# Patient Record
Sex: FEMALE | Race: WHITE | Employment: FULL TIME | URBAN - METROPOLITAN AREA
[De-identification: names, ages, dates, MRNs, and addresses within clinical notes are randomized per-mention and may not be internally consistent; named-entity substitution may affect disease eponyms.]

---

## 2017-01-31 ENCOUNTER — OFFICE VISIT (OUTPATIENT)
Dept: BEHAVIORAL/MENTAL HEALTH CLINIC | Age: 47
End: 2017-01-31

## 2017-01-31 VITALS
SYSTOLIC BLOOD PRESSURE: 115 MMHG | BODY MASS INDEX: 25.58 KG/M2 | HEART RATE: 84 BPM | DIASTOLIC BLOOD PRESSURE: 69 MMHG | WEIGHT: 148 LBS

## 2017-01-31 DIAGNOSIS — F33.42 MAJOR DEPRESSION, RECURRENT, FULL REMISSION (HCC): Primary | ICD-10-CM

## 2017-01-31 DIAGNOSIS — F41.1 GENERALIZED ANXIETY DISORDER: ICD-10-CM

## 2017-01-31 DIAGNOSIS — F32.5 MAJOR DEPRESSION IN COMPLETE REMISSION (HCC): ICD-10-CM

## 2017-01-31 RX ORDER — BUPROPION HYDROCHLORIDE 300 MG/1
300 TABLET ORAL
Qty: 90 TAB | Refills: 1 | Status: SHIPPED | OUTPATIENT
Start: 2017-01-31 | End: 2017-07-31 | Stop reason: SDUPTHER

## 2017-01-31 RX ORDER — ESCITALOPRAM OXALATE 20 MG/1
20 TABLET ORAL DAILY
Qty: 90 TAB | Refills: 1 | Status: SHIPPED | OUTPATIENT
Start: 2017-01-31 | End: 2017-07-31 | Stop reason: SDUPTHER

## 2017-01-31 RX ORDER — ZOLPIDEM TARTRATE 10 MG/1
10 TABLET ORAL
Qty: 30 TAB | Refills: 0 | Status: SHIPPED | OUTPATIENT
Start: 2017-01-31 | End: 2017-07-31 | Stop reason: SDUPTHER

## 2017-01-31 NOTE — MR AVS SNAPSHOT
Visit Information Date & Time Provider Department Dept. Phone Encounter #  
 1/31/2017  2:30 PM Julee Beal MD [address] [facility] [phone] [id number] Upcoming Health Maintenance Date Due  
 PAP AKA CERVICAL CYTOLOGY 5/13/2017 DTaP/Tdap/Td series (2 - Td) 7/10/2025 Allergies as of 1/31/2017  Review Complete On: 1/31/2017 By: Rodger Oliveira Severity Noted Reaction Type Reactions Pcn [Penicillins]  02/28/2011    Rash Current Immunizations  Reviewed on 7/10/2015 Name Date Influenza Vaccine 10/16/2015, 12/2/2014, 10/1/2013 Influenza Vaccine Whole 10/1/2010 TD Vaccine 2/28/2008 Tdap 7/10/2015 Not reviewed this visit You Were Diagnosed With   
  
 Codes Comments [facility]     ICD-10-CM: F32.5 ICD-9-CM: 296.26 Generalized anxiety disorder     ICD-10-CM: F41.1 ICD-9-CM: 300.02 Vitals BP Pulse Weight(growth percentile) LMP BMI OB Status 115/69 (BP 1 Location: Right arm) 84 148 lb (67.1 kg) 01/26/2017 25.58 kg/m2 Having regular periods Smoking Status Never Smoker BMI and BSA Data Body Mass Index Body Surface Area 25.58 kg/m 2 1.74 m 2 Preferred Pharmacy Pharmacy Name Phone  [address] [phone] Your Updated Medication List  
  
   
This list is accurate as of: 1/31/17  3:00 PM.  Always use your most recent med list.  
  
  
  
  
 Biotin 2,500 mcg Cap Take  by mouth. buPROPion  mg XL tablet Commonly known as:  Smiley Sizer Take 1 Tab by mouth every morning. cholecalciferol 1,000 unit tablet Commonly known as:  VITAMIN D3 Take  by mouth daily. cyanocobalamin 1,000 mcg sublingual tablet Commonly known as:  VITAMIN B-12 Take 1,000 mcg by mouth daily. escitalopram oxalate 20 mg tablet Commonly known as:  Duwaine Needs Take 1 Tab by mouth daily. levonorgestrel-ethinyl estradiol 50-30 (6)/75-40 (5)/125-30(10) Tab Commonly known as:  [employer] Take 1 Tab by mouth daily. multivitamin tablet Commonly known as:  ONE A DAY Take 1 Tab by mouth daily. SYNTHROID 137 mcg tablet Generic drug:  levothyroxine Take  by mouth Daily (before breakfast). zolpidem 10 mg tablet Commonly known as:  AMBIEN Take 1 Tab by mouth nightly as needed for Sleep. Prescriptions Printed Refills  
 zolpidem (AMBIEN) 10 mg tablet 0 Sig: Take 1 Tab by mouth nightly as needed for Sleep. Class: Print Route: Oral  
  
Prescriptions Sent to Pharmacy Refills buPROPion XL (WELLBUTRIN XL) 300 mg XL tablet 1 Sig: Take 1 Tab by mouth every morning. Class: Normal  
 Pharmacy: [place] [address] Ph #: [phone] Route: Oral  
 escitalopram oxalate (LEXAPRO) 20 mg tablet 1 Sig: Take 1 Tab by mouth daily. Class: Normal  
 Pharmacy: [place] [address] Ph #: [phone] Route: Oral  
  
Introducing [facility] & HEALTH SERVICES! Dear Lu Cleveland: Thank you for requesting a Deezer account. Our records indicate that you already have an active Deezer account. You can access your account anytime at [email or web address] Did you know that you can access your hospital and ER discharge instructions at any time in Deezer? You can also review all of your test results from your hospital stay or ER visit. Additional Information If you have questions, please visit the Frequently Asked Questions section of the Deezer website at [email or web address] Remember, Deezer is NOT to be used for urgent needs. For medical emergencies, dial 911. Now available from your iPhone and Android! Please provide this summary of care documentation to your next provider. Your primary care clinician is listed as [address]. If you have any questions after today's visit, please call [phone].

## 2017-01-31 NOTE — PROGRESS NOTES
Psychiatric Outpatient Progress Note    Date: 1/31/2017  Account Number:  [id number]  Name: Annie Whittaker    Length of psychotherapy session: 20 minutes       SUBJECTIVE:   Annie Whittaker  is a 52 y.o.  female  patient presents for a therapy/psychopharmacological management appointment. Ms. Swathi Woodward presents to the appointment reporting stable mood despite losing her job in August 2017. She had to increase her Lexapro to 20mg in September due to decline in her mood and she says that it was very helpful. She is currently looking for a job, but she has severance until December 2017. Continued relationship with boyfriend of 3 years. Continued Exercise with a . Primary stressor- looking for work, mother's depression          PFSHx: no changes  ROS:   Appetite:good , Sleep: much improved; A review of neurological, orthopedic, HEENT, Constitutional systems were within normal limits.     Weight gain- 160.6lbs    Response to Treatment: improving  Side Effects: denied    Supportive/Cognitive/Reality-Oriented psychotherapy provided in regards to psychosocial stressors:   Current problems   Housing issues   Occupational issues- unemployed; looking for work   Academic issues   Legal issues   Medical issues   Interpersonal conflicts  Psychoeducation provided  Treatment plan reviewed with patient-including diagnosis and medications  Worked on issues of denial & effects of substance dependency/use      OBJECTIVE:                 Mental Status exam: WNL except for      Sensorium  oriented to time, place and person   Relations cooperative    Eye Contact    appropriate   Appearance:  age appropriate and casually dressed   Motor Behavior:  gait stable   Speech:  normal pitch and normal volume   Thought Process: goal directed and within normal limits   Thought Content free of delusions and free of hallucinations   Suicidal ideations none   Homicidal ideations none   Mood:   stable, improved   Affect:   stable, improved Memory recent  adequate   Memory remote:  adequate   Concentration:  adequate   Abstraction:  abstract   Insight:  good   Reliability good   Judgment:  good       Allergies   Allergen Reactions    Pcn [Penicillins] Rash        Current Outpatient Prescriptions   Medication Sig Dispense Refill    buPROPion XL (WELLBUTRIN XL) 300 mg XL tablet Take 1 Tab by mouth every morning. 90 Tab 1    escitalopram oxalate (LEXAPRO) 20 mg tablet Take 1 Tab by mouth daily. 90 Tab 1    zolpidem (AMBIEN) 10 mg tablet Take 1 Tab by mouth nightly as needed for Sleep. 30 Tab 0    levonorgestrel-ethinyl estradiol (MYZILRA) 50-30 (6)/75-40 (5)/125-30(10) tab Take 1 Tab by mouth daily. 84 Tab 2    cholecalciferol (VITAMIN D3) 1,000 unit tablet Take  by mouth daily.  cyanocobalamin (VITAMIN B-12) 1,000 mcg sublingual tablet Take 1,000 mcg by mouth daily.  Biotin 2,500 mcg cap Take  by mouth.  multivitamin (ONE A DAY) tablet Take 1 Tab by mouth daily.  levothyroxine (SYNTHROID) 137 mcg tablet Take  by mouth Daily (before breakfast). MEDICAL DECISION MAKING    Data: pertinent labs, imaging, medical records and diagnostic tests reviewed and incorporated in diagnosis and treatment plan    Diagnoses/ Problems:   1. Depression    Improving, stable  2. Generalized Anxiety Disorder    Improving, stable    Assessment:  Rosalva Goodman  is a 52 y.o.  female patient presents with improving depression and anxiety. Risk Scoring- chronic illnesses and prescription drug management    Treatment Plan:  1.   Medications:     Continue Wellbutrin, Lexapro 20mg daily        Orders Placed This Encounter    buPROPion XL (WELLBUTRIN XL) 300 mg XL tablet    escitalopram oxalate (LEXAPRO) 20 mg tablet    zolpidem (AMBIEN) 10 mg tablet           The following regarding medications was addressed:    (The risks and benefits of the proposed medication; the potential medication side effects ie    dry mouth, weight gain, GI upset, headache; patient given opportunity to ask questions)       Medication Changes/Adjustments:   Medications Discontinued During This Encounter   Medication Reason    buPROPion XL (WELLBUTRIN XL) 300 mg XL tablet Reorder    escitalopram oxalate (LEXAPRO) 10 mg tablet Reorder    zolpidem (AMBIEN) 10 mg tablet Reorder      2. Provided supportive psychotherapy: addressed safety risk, recent stressors, provided validation and assisted with coping skills and problem solving  3. Labs/ tests/ old records/ collateral will be obtained  4. Follow-up Disposition:  Return in about 6 months (around 7/31/2017).

## 2017-03-30 ENCOUNTER — OFFICE VISIT (OUTPATIENT)
Dept: INTERNAL MEDICINE CLINIC | Age: 47
End: 2017-03-30

## 2017-03-30 VITALS
SYSTOLIC BLOOD PRESSURE: 130 MMHG | BODY MASS INDEX: 24.92 KG/M2 | HEIGHT: 64 IN | HEART RATE: 81 BPM | DIASTOLIC BLOOD PRESSURE: 90 MMHG | RESPIRATION RATE: 18 BRPM | TEMPERATURE: 99.7 F | WEIGHT: 146 LBS | OXYGEN SATURATION: 97 %

## 2017-03-30 DIAGNOSIS — R68.83 CHILLS: ICD-10-CM

## 2017-03-30 DIAGNOSIS — J06.9 VIRAL URI WITH COUGH: Primary | ICD-10-CM

## 2017-03-30 DIAGNOSIS — R05.9 COUGH: ICD-10-CM

## 2017-03-30 LAB
QUICKVUE INFLUENZA TEST: NEGATIVE
VALID INTERNAL CONTROL?: YES

## 2017-03-30 NOTE — PROGRESS NOTES
Chief Complaint   Patient presents with    Cough     x 1 day    Nasal Congestion     mucous clear and thick    Dizziness     x 1 day    Chills    Nausea     1. Have you been to the ER, urgent care clinic since your last visit? Hospitalized since your last visit? No    2. Have you seen or consulted any other health care providers outside of the [address] [place] [address] since your last visit? Include any pap smears or colon screening.  Yes When: last Fall  Where: [facility]

## 2017-03-30 NOTE — PROGRESS NOTES
53 yo female with a URI that presented in chest first.  She felt feverish yesterday, and, despite having had a flu shot, she was fearful she may have flu. She attended a large event 5 days ago, but has not traveled. She has been taking Tylenol. PE: WNWD WF   T - 99.7   Phar - nl   Nodes - none palp   TMs - normal   Lungs - clear    Imp: URI w/ cough    Plan: Mucinex, Saline NS, Hydration  ____________________  Expected course of current diagnosed problem(s) as well as expected progression and possible complications, and desired follow up with provider are discussed with patient. Patient is encouraged to be back in touch with any questions or concerns. Patient expresses understanding of plan of care. Patient is given AVS which includes diagnoses, current medications, vitals.

## 2017-03-30 NOTE — MR AVS SNAPSHOT
Visit Information Date & Time Provider Department Dept. Phone Encounter #  
 3/30/2017  3:00 PM Jayden Hobbs NP [place] [id number] [facility] [id number] Your Appointments 7/31/2017  3:30 PM  
ESTABLISHED PATIENT with Eugenio Henderson MD  
[address] [address]) Appt Note: fu  
 [facility] [address] [place] [address]  
[phone] [address] [place] [address] Upcoming Health Maintenance Date Due  
 PAP AKA CERVICAL CYTOLOGY 5/13/2017 DTaP/Tdap/Td series (2 - Td) 7/10/2025 Allergies as of 3/30/2017  Review Complete On: 3/30/2017 By: Jayden Hobbs NP Severity Noted Reaction Type Reactions Pcn [Penicillins]  02/28/2011    Rash Current Immunizations  Reviewed on 7/10/2015 Name Date Influenza Vaccine 10/16/2015, 12/2/2014, 10/1/2013 Influenza Vaccine Whole 10/1/2010 TD Vaccine 2/28/2008 Tdap 7/10/2015 Not reviewed this visit You Were Diagnosed With   
  
 Codes Comments Viral URI with cough    -  Primary ICD-10-CM: J06.9, B97.89 ICD-9-CM: 465.9 Chills     ICD-10-CM: R68.83 ICD-9-CM: 780.64 Cough     ICD-10-CM: R05 ICD-9-CM: 579. 2 Vitals BP Pulse Temp Resp Height(growth percentile) Weight(growth percentile) 130/90 (BP 1 Location: Left arm, BP Patient Position: Sitting) 81 99.7 °F (37.6 °C) (Oral) 18 5' 3.78\" (1.62 m) 146 lb (66.2 kg) LMP SpO2 BMI OB Status Smoking Status 03/22/2017 97% 25.23 kg/m2 Having regular periods Never Smoker Vitals History BMI and BSA Data Body Mass Index Body Surface Area  
 25.23 kg/m 2 1.73 m 2 Preferred Pharmacy Pharmacy Name Phone  [address] [phone] Your Updated Medication List  
  
   
This list is accurate as of: 3/30/17  4:05 PM.  Always use your most recent med list.  
  
  
  
  
 Biotin 2,500 mcg Cap Take  by mouth. buPROPion  mg XL tablet Commonly known as:  Zoila Aragon Take 1 Tab by mouth every morning. cholecalciferol 1,000 unit tablet Commonly known as:  VITAMIN D3 Take  by mouth daily. cyanocobalamin 1,000 mcg sublingual tablet Commonly known as:  VITAMIN B-12 Take 1,000 mcg by mouth daily. escitalopram oxalate 20 mg tablet Commonly known as:  [employer] B[employer] Take 1 Tab by mouth daily. levonorgestrel-ethinyl estradiol 50-30 (6)/75-40 (5)/125-30(10) Tab Commonly known as:  [employer] Take 1 Tab by mouth daily. multivitamin tablet Commonly known as:  ONE A DAY Take 1 Tab by mouth daily. SYNTHROID 137 mcg tablet Generic drug:  levothyroxine Take  by mouth Daily (before breakfast). zolpidem 10 mg tablet Commonly known as:  AMBIEN Take 1 Tab by mouth nightly as needed for Sleep. We Performed the Following AMB POC RAPID INFLUENZA TEST [40417 CPT(R)] Patient Instructions 1. Mucinex (Guaifenesen) plain-Blue Box 600 mg. Take one twice daily with full glass water Take for 10 days 2. Saline Nasal Spray - use liberally to flush post-nasal area; use as many times a day as desired. Keep spraying with head tilted back until you feel need to swallow 3. Drink lots of fluids (mainly water) to keep mucus thinner 4. If needed, for cough, we recommend Delsym cough syrup 5. Long acting antihistamine (Allegra/Fexofenadine or Zyrtec/Ceterizine) is useful if allergy symptoms are also present 6. Decongestants should not be used as they actually contribute to overdrying/thickening of the mucus, and can raise the BP and overstimulate the heart 7. Steroid nasal spray (Nasacort AQ) - 2 sprays each nostril once daily; use with head in upright position Introducing \[facility]\"" & HEALTH SERVICES! Dear Judy Lock: Thank you for requesting a RedMarthart account.   Our records indicate that you already have an active EatingWell account. You can access your account anytime at [email or web address] Did you know that you can access your hospital and ER discharge instructions at any time in EatingWell? You can also review all of your test results from your hospital stay or ER visit. Additional Information If you have questions, please visit the Frequently Asked Questions section of the EatingWell website at [email or web address] Remember, EatingWell is NOT to be used for urgent needs. For medical emergencies, dial 911. Now available from your iPhone and Android! Please provide this summary of care documentation to your next provider. Your primary care clinician is listed as Anastasia King. If you have any questions after today's visit, please call [phone].

## 2017-03-30 NOTE — PATIENT INSTRUCTIONS
1. Mucinex (Guaifenesen) plain-Blue Box 600 mg. Take one twice daily with full glass water   Take for 10 days    2. Saline Nasal Spray - use liberally to flush post-nasal area; use as many times a day as desired. Keep spraying with head tilted back until you feel need to swallow    3. Drink lots of fluids (mainly water) to keep mucus thinner    4. If needed, for cough, we recommend Delsym cough syrup    5. Long acting antihistamine (Allegra/Fexofenadine or Zyrtec/Ceterizine) is useful if allergy symptoms are also present    6. Decongestants should not be used as they actually contribute to overdrying/thickening of the mucus, and can raise the BP and overstimulate the heart    7.  Steroid nasal spray (Nasacort AQ) - 2 sprays each nostril once daily; use with head in upright position

## 2017-07-17 RX ORDER — LEVONORGESTREL AND ETHINYL ESTRADIOL 6-5-10
KIT ORAL
Qty: 84 TAB | Refills: 0 | Status: SHIPPED | OUTPATIENT
Start: 2017-07-17 | End: 2017-08-01 | Stop reason: SDUPTHER

## 2017-07-31 ENCOUNTER — OFFICE VISIT (OUTPATIENT)
Dept: BEHAVIORAL/MENTAL HEALTH CLINIC | Age: 47
End: 2017-07-31

## 2017-07-31 DIAGNOSIS — F33.42 MAJOR DEPRESSION, RECURRENT, FULL REMISSION (HCC): Primary | ICD-10-CM

## 2017-07-31 DIAGNOSIS — F41.1 GENERALIZED ANXIETY DISORDER: ICD-10-CM

## 2017-07-31 DIAGNOSIS — F32.5 MAJOR DEPRESSION IN COMPLETE REMISSION (HCC): ICD-10-CM

## 2017-07-31 RX ORDER — ZOLPIDEM TARTRATE 10 MG/1
10 TABLET ORAL
Qty: 90 TAB | Refills: 1 | Status: SHIPPED | OUTPATIENT
Start: 2017-07-31 | End: 2017-11-13

## 2017-07-31 RX ORDER — BUPROPION HYDROCHLORIDE 300 MG/1
300 TABLET ORAL
Qty: 90 TAB | Refills: 1 | Status: SHIPPED | OUTPATIENT
Start: 2017-07-31 | End: 2017-11-13 | Stop reason: SDUPTHER

## 2017-07-31 RX ORDER — ESCITALOPRAM OXALATE 20 MG/1
20 TABLET ORAL DAILY
Qty: 90 TAB | Refills: 1 | Status: SHIPPED | OUTPATIENT
Start: 2017-07-31 | End: 2017-11-13 | Stop reason: SDUPTHER

## 2017-07-31 NOTE — PROGRESS NOTES
Psychiatric Outpatient Progress Note    Date: 7/31/2017  Account Number:  [id number]  Name: Lizbet Morris    Length of psychotherapy session: 20 minutes       SUBJECTIVE:   Lizbet Morris  is a 52 y.o.  female  patient presents for a therapy/psychopharmacological management appointment. Ms. Hiro Michele presents to the appointment reporting stable mood despite mother's depression/ parkinson's disease, at the end of her severance, inability find a job. She is sleeping well at night ( no ambien in 6 months), good appetite and she denies feelings of depression, although challenging at times to not be consumed by her disappointment in not finding a job. Remains in a  Good relationship with her boyfriend. Primary stressor- mother and work. PFSHx: no changes  ROS:   Appetite:good , Sleep: much improved; A review of neurological, orthopedic, HEENT, Constitutional systems were within normal limits.     Weight gain- 160.6lbs    Response to Treatment: improving  Side Effects: denied    Supportive/Cognitive/Reality-Oriented psychotherapy provided in regards to psychosocial stressors:   Current problems   Housing issues   Occupational issues- unemployed; looking for work   Academic issues   Legal issues   Medical issues   Interpersonal conflicts  Psychoeducation provided  Treatment plan reviewed with patient-including diagnosis and medications  Worked on issues of denial & effects of substance dependency/use      OBJECTIVE:                 Mental Status exam: WNL except for      Sensorium  oriented to time, place and person   Relations cooperative    Eye Contact    appropriate   Appearance:  age appropriate and casually dressed   Motor Behavior:  gait stable   Speech:  normal pitch and normal volume   Thought Process: goal directed and within normal limits   Thought Content free of delusions and free of hallucinations   Suicidal ideations none   Homicidal ideations none   Mood:   stable, improved   Affect:   stable, improved Memory recent  adequate   Memory remote:  adequate   Concentration:  adequate   Abstraction:  abstract   Insight:  good   Reliability good   Judgment:  good       Allergies   Allergen Reactions    Pcn [Penicillins] Rash        Current Outpatient Prescriptions   Medication Sig Dispense Refill    ENPRESSE 50-30 (6)/75-40 (5)/125-30(10) tab TAKE 1 TABLET BY MOUTH EVERY DAY 84 Tab 0    buPROPion XL (WELLBUTRIN XL) 300 mg XL tablet Take 1 Tab by mouth every morning. 90 Tab 1    escitalopram oxalate (LEXAPRO) 20 mg tablet Take 1 Tab by mouth daily. 90 Tab 1    zolpidem (AMBIEN) 10 mg tablet Take 1 Tab by mouth nightly as needed for Sleep. 30 Tab 0    cholecalciferol (VITAMIN D3) 1,000 unit tablet Take  by mouth daily.  cyanocobalamin (VITAMIN B-12) 1,000 mcg sublingual tablet Take 1,000 mcg by mouth daily.  Biotin 2,500 mcg cap Take  by mouth.  multivitamin (ONE A DAY) tablet Take 1 Tab by mouth daily.  levothyroxine (SYNTHROID) 137 mcg tablet Take  by mouth Daily (before breakfast). MEDICAL DECISION MAKING    Data: pertinent labs, imaging, medical records and diagnostic tests reviewed and incorporated in diagnosis and treatment plan    Diagnoses/ Problems:   1. Depression    Improving, stable  2. Generalized Anxiety Disorder    Improving, stable    Assessment:  Dhruv Hernandez  is a 52 y.o.  female patient presents with improving depression and anxiety. Risk Scoring- chronic illnesses and prescription drug management    Treatment Plan:  1. Medications:     Continue Wellbutrin, Lexapro 20mg daily        No orders of the defined types were placed in this encounter. The following regarding medications was addressed:    (The risks and benefits of the proposed medication; the potential medication side effects ie    dry mouth, weight gain, GI upset, headache; patient given opportunity to ask questions)       Medication Changes/Adjustments: There are no discontinued medications. 2.  Provided supportive psychotherapy: addressed safety risk, recent stressors, provided validation and assisted with coping skills and problem solving  3. Labs/ tests/ old records/ collateral will be obtained  4.   Follow-up Disposition: Not on File

## 2017-08-01 ENCOUNTER — OFFICE VISIT (OUTPATIENT)
Dept: INTERNAL MEDICINE CLINIC | Age: 47
End: 2017-08-01

## 2017-08-01 ENCOUNTER — HOSPITAL ENCOUNTER (OUTPATIENT)
Dept: LAB | Age: 47
Discharge: HOME | End: 2017-08-01
Payer: COMMERCIAL

## 2017-08-01 VITALS
DIASTOLIC BLOOD PRESSURE: 70 MMHG | TEMPERATURE: 98.8 F | WEIGHT: 145.2 LBS | HEART RATE: 83 BPM | OXYGEN SATURATION: 97 % | HEIGHT: 64 IN | BODY MASS INDEX: 24.79 KG/M2 | SYSTOLIC BLOOD PRESSURE: 102 MMHG | RESPIRATION RATE: 16 BRPM

## 2017-08-01 DIAGNOSIS — F41.1 GENERALIZED ANXIETY DISORDER: ICD-10-CM

## 2017-08-01 DIAGNOSIS — E03.9 ACQUIRED HYPOTHYROIDISM: ICD-10-CM

## 2017-08-01 DIAGNOSIS — Z12.4 PAP SMEAR FOR CERVICAL CANCER SCREENING: ICD-10-CM

## 2017-08-01 DIAGNOSIS — M25.511 ACUTE PAIN OF RIGHT SHOULDER: ICD-10-CM

## 2017-08-01 DIAGNOSIS — Z00.00 ROUTINE GENERAL MEDICAL EXAMINATION AT A HEALTH CARE FACILITY: Primary | ICD-10-CM

## 2017-08-01 DIAGNOSIS — L91.8 SKIN TAGS, MULTIPLE ACQUIRED: ICD-10-CM

## 2017-08-01 DIAGNOSIS — Z12.39 BREAST CANCER SCREENING: ICD-10-CM

## 2017-08-01 DIAGNOSIS — F33.42 MAJOR DEPRESSION, RECURRENT, FULL REMISSION (HCC): ICD-10-CM

## 2017-08-01 PROCEDURE — 87625 HPV TYPES 16 & 18 ONLY: CPT | Performed by: NURSE PRACTITIONER

## 2017-08-01 PROCEDURE — 87624 HPV HI-RISK TYP POOLED RSLT: CPT

## 2017-08-01 PROCEDURE — 88175 CYTOPATH C/V AUTO FLUID REDO: CPT

## 2017-08-01 RX ORDER — DICLOFENAC SODIUM 75 MG/1
75 TABLET, DELAYED RELEASE ORAL
Qty: 30 TAB | Refills: 0 | Status: SHIPPED | OUTPATIENT
Start: 2017-08-01 | End: 2017-11-01 | Stop reason: SDUPTHER

## 2017-08-01 RX ORDER — LEVONORGESTREL AND ETHINYL ESTRADIOL 6-5-10
1 KIT ORAL DAILY
Qty: 84 TAB | Refills: 3 | Status: SHIPPED | OUTPATIENT
Start: 2017-08-01 | End: 2017-11-13 | Stop reason: ALTCHOICE

## 2017-08-01 NOTE — MR AVS SNAPSHOT
Visit Information Date & Time Provider Department Dept. Phone Encounter #  
 8/1/2017 10:30 AM [facility] [phone] [id number] Follow-up Instructions Return in about 6 months (around 2/1/2018), or if symptoms worsen or fail to improve, for hypothyroidism. Your Appointments 11/13/2017  3:30 PM  
ESTABLISHED PATIENT with Alpa Naik MD  
[address] [facility] [address] [place] [address]) Appt Note: fu  
 [address] [facility] [address] [place] [address]  
[phone] [address] [place] [address] [address] [place] [address] Upcoming Health Maintenance Date Due  
 PAP AKA CERVICAL CYTOLOGY 5/13/2017 INFLUENZA AGE 9 TO ADULT 8/1/2017 DTaP/Tdap/Td series (2 - Td) 7/10/2025 Allergies as of 8/1/2017  Review Complete On: 8/1/2017 By: Rose Larkin LPN Severity Noted Reaction Type Reactions Pcn [Penicillins]  02/28/2011    Rash Current Immunizations  Reviewed on 7/10/2015 Name Date Influenza Vaccine 10/16/2015, 12/2/2014, 10/1/2013 Influenza Vaccine Whole 10/1/2010 TD Vaccine 2/28/2008 Tdap 7/10/2015 Not reviewed this visit You Were Diagnosed With   
  
 Codes Comments Routine general medical examination at a health care facility    -  Primary ICD-10-CM: Z00.00 ICD-9-CM: V70.0 Acquired hypothyroidism     ICD-10-CM: E03.9 ICD-9-CM: 244.9 Pap smear for cervical cancer screening     ICD-10-CM: Z12.4 ICD-9-CM: V76.2 Major depression, recurrent, full [facility]     ICD-10-CM: F33.42 
ICD-9-CM: 296.36 Generalized anxiety disorder     ICD-10-CM: F41.1 ICD-9-CM: 300.02 Breast cancer screening     ICD-10-CM: Z12.39 
ICD-9-CM: V76.10 Acute pain of right shoulder     ICD-10-CM: M25.511 ICD-9-CM: 719.41 Skin tags, multiple acquired     ICD-10-CM: L91.8 ICD-9-CM: 701.9 Vitals BP Pulse Temp Resp Height(growth percentile) Weight(growth percentile) 102/70 (BP 1 Location: Left arm, BP Patient Position: Sitting) 83 98.8 °F (37.1 °C) (Oral) 16 5' 3.78\" (1.62 m) 145 lb 3.2 oz (65.9 kg) LMP SpO2 BMI OB Status Smoking Status 07/11/2017 97% 25.1 kg/m2 Having regular periods Never Smoker Vitals History BMI and BSA Data Body Mass Index Body Surface Area  
 25.1 kg/m 2 1.72 m 2 Preferred Pharmacy Pharmacy Name Phone  [address] [phone] Your Updated Medication List  
  
   
This list is accurate as of: 8/1/17 12:19 PM.  Always use your most recent med list.  
  
  
  
  
 Biotin 2,500 mcg Cap Take  by mouth. buPROPion  mg XL tablet Commonly known as:  Roosvelt Blazing Take 1 Tab by mouth every morning. cholecalciferol 1,000 unit tablet Commonly known as:  VITAMIN D3 Take  by mouth daily. cyanocobalamin 1,000 mcg sublingual tablet Commonly known as:  VITAMIN B-12 Take 1,000 mcg by mouth daily. diclofenac EC 75 mg EC tablet Commonly known as:  VOLTAREN Take 1 Tab by mouth two (2) times daily as needed. Indications: shoulder pain  
  
 escitalopram oxalate 20 mg tablet Commonly known as:  Chandu Pulley Take 1 Tab by mouth daily. levonorgestrel-ethinyl estradiol 50-30 (6)/75-40 (5)/125-30(10) Tab Commonly known as:  ENPRESSE Take 1 Tab by mouth daily. Indications: POSTCOITAL CONTRACEPTION  
  
 multivitamin tablet Commonly known as:  ONE A DAY Take 1 Tab by mouth daily. SYNTHROID 137 mcg tablet Generic drug:  levothyroxine Take  by mouth Daily (before breakfast). zolpidem 10 mg tablet Commonly known as:  AMBIEN Take 1 Tab by mouth nightly as needed for Sleep. Prescriptions Sent to Pharmacy Refills  
 levonorgestrel-ethinyl estradiol (ENPRESSE) 50-30 (6)/75-40 (5)/125-30(10) tab 3 Sig: Take 1 Tab by mouth daily. Indications: POSTCOITAL CONTRACEPTION Class: Normal  
 Pharmacy: [facility] [address] Ph #: [phone] Route: Oral  
 diclofenac EC (VOLTAREN) 75 mg EC tablet 0 Sig: Take 1 Tab by mouth two (2) times daily as needed. Indications: shoulder pain  
 Class: Normal  
 Pharmacy: [facility] [address] Ph #: [phone] Route: Oral  
  
We Performed the Following CBC WITH AUTOMATED DIFF [48858 CPT(R)] FOLATE [id number] CPT(R)] HPV, 16/18,45 [[id number] Custom] LIPID PANEL [81273 CPT(R)] METABOLIC PANEL, COMPREHENSIVE [99759 CPT(R)] TSH RFX ON ABNORMAL TO FREE T4 [[id number] Custom] VITAMIN B12 [id number] CPT(R)] VITAMIN D, 25 HYDROXY [id number] CPT(R)] Follow-up Instructions Return in about 6 months (around 2/1/2018), or if symptoms worsen or fail to improve, for hypothyroidism. To-Do List   
 08/02/2017 Imaging:  [facility] BI SCREENING INCL CAD Patient Instructions Pap Test: Care Instructions Your Care Instructions The Pap test (also called a Pap smear) is a screening test for cancer of the cervix, which is the lower part of the uterus that opens into the vagina. The test can help your doctor find early changes in the cells that could lead to cancer. The sample of cells taken during your test has been sent to a lab so that an expert can look at the cells. It usually takes a week or two to get the results back. Follow-up care is a key part of your treatment and safety. Be sure to make and go to all appointments, and call your doctor if you are having problems. It's also a good idea to know your test results and keep a list of the medicines you take. What do the results mean? · A normal result means that the test did not find any abnormal cells in the sample. · An abnormal result can mean many things. Most of these are not cancer. The results of your test may be abnormal because: 
¨ You have an infection of the vagina or cervix, such as a yeast infection. ¨ You have an IUD (intrauterine device for birth control). ¨ You have low estrogen levels after menopause that are causing the cells to change. ¨ You have cell changes that may be a sign of precancer or cancer. The results are ranked based on how serious the changes might be. There are many other reasons why you might not get a normal result. If the results were abnormal, you may need to get another test within a few weeks or months. If the results show changes that could be a sign of cancer, you may need a test called a colposcopy, which provides a more complete view of the cervix. Sometimes the lab cannot use the sample because it does not contain enough cells or was not preserved well. If so, you may need to have the test again. This is not common, but it does happen from time to time. When should you call for help? Watch closely for changes in your health, and be sure to contact your doctor if: 
· You have vaginal bleeding or pain for more than 2 days after the test. It is normal to have a small amount of bleeding for a day or two after the test. 
Where can you learn more? Go to http://jason-francisco javier.info/. Enter R563 in the search box to learn more about \"Pap Test: Care Instructions. \" Current as of: July 26, 2016 Content Version: 11.3 © 3079-4936 [employer]. Care instructions adapted under license by "[employer] (which disclaims liability or warranty for this information). If you have questions about a medical condition or this instruction, always ask your healthcare professional. [address] any warranty or liability for your use of this information. Introducing [facility]! Dear Deja Gordon: Thank you for requesting a [employer] account.   Our records indicate that you already have an active [employer] account. You can access your account anytime at [email or web address]/The [email or web address] Did you know that you can access your hospital and ER discharge instructions at any time in [employer]? You can also review all of your test results from your hospital stay or ER visit. Additional Information If you have questions, please visit the Frequently Asked Questions section of the [employer] website at [email or web address] Remember, [employer] is NOT to be used for urgent needs. For medical emergencies, dial 911. Now available from your iPhone and Android! Please provide this summary of care documentation to your next provider. Your primary care clinician is listed as [address]. If you have any questions after today's visit, please call [phone].

## 2017-08-01 NOTE — PROGRESS NOTES
1. Have you been to the ER, urgent care clinic since your last visit? Hospitalized since your last visit[facility]2017.     2. Have you seen or consulted any other health care providers outside of the [address] [place] [address] [place] since your last visit? Include any pap smears or colon screening. [facility] for left sprained ankle 12/2016 and right ankle 5/2017. Chief Complaint   Patient presents with   [facility] Establish Care    Complete Physical     patient will need pap smear today, also needs order for mammogram    Shoulder Pain     pain on right shoulder for the past 3 months; states she works out and may be due to exercise.  Skin Problem     patient states she has a couple of skin tags that need to be removed under the armpit. Not fasting  Fall Risk Assessment, last 12 mths 8/1/2017   Able to walk? Yes   Fall in past 12 months? Yes   Fall with injury?  Yes   Number of falls in past 12 months 2   Fall Risk Score 3

## 2017-08-01 NOTE — PROGRESS NOTES
Subjective:      Elijah Sharma is a 52 y.o. female who presents today for CPE and annual exam.    Skin Tags:  Some axillary area, nicks when she shaves and is painful  Has never seen a dermatologist  No changing moles or skin tags    R shoulder Pain:  Last year had L shoulder pain- went to [address] [place] [address] (told it was musclar- had given voltaren gel which helped). No longer having L shoulder pain, but now having R shoulder pain \"it's the exact same feeling\"  Pain worse with pressing down on arm or lifting arm straight  Reports normal ROM   No falls or trauma precipitating the pain    Hypothroidism:  Since age 9  Levothyroxine 137mcg daily, on this dose for the past several years (at least 3 years)  + fatigue  No constipation    Depression:  Takes Welbutrin and Lexapro  Under Dr. Mike Yung, at [facility]. Sees every 6 months  Hospitalization 4 years ago    Health maintenance:   Exercise, diet:  LMP: 7/11/17  Last pap: 3 years ago, [id number] abnormal Pap->Leep (done at [facility])  Last mammogram: 2016- normal  Last tetanus vaccination. Last pneumonia vaccination. Last influenza vaccination. Zostavax. Patient Active Problem List    Diagnosis Date Noted    Major depression, recurrent, full remission ([facility] 09/29/2015    Generalized anxiety disorder 09/29/2015    Pap smear for cervical cancer screening 01/14/2014    Headache 04/25/2012    Hypothyroidism 02/28/2011        Review of Systems    Review of Systems   Constitutional: Negative for chills and fever. HENT: Negative for congestion, ear pain, hearing loss, sore throat and tinnitus. Respiratory: Negative. Negative for cough and shortness of breath. Cardiovascular: Negative for chest pain and leg swelling. Gastrointestinal: Negative for abdominal pain, blood in stool, constipation and diarrhea. Genitourinary: Negative. Musculoskeletal: Positive for joint pain.         See HPI   Skin:        See HPI   Neurological: Negative for dizziness and headaches. Psychiatric/Behavioral:        See HPI        Objective:     Vitals:    08/01/17 1025   BP: 102/70   Pulse: 83   Resp: 16   Temp: 98.8 °F (37.1 °C)   TempSrc: Oral   SpO2: 97%   Weight: 145 lb 3.2 oz (65.9 kg)   Height: 5' 3.78\" (1.62 m)   PainSc:   4   PainLoc: Shoulder   LMP: 07/11/2017     Physical Exam   Constitutional: She is oriented to person, place, and time. She appears well-developed and well-nourished. HENT:   Head: Normocephalic. Right Ear: Tympanic membrane, external ear and ear canal normal.   Left Ear: Tympanic membrane, external ear and ear canal normal.   Nose: Nose normal.   Mouth/Throat: Oropharynx is clear and moist.   Eyes: Conjunctivae and EOM are normal. Pupils are equal, round, and reactive to light. Neck: Normal range of motion. Neck supple. No JVD present. Carotid bruit is not present. No tracheal deviation present. No thyromegaly present. Cardiovascular: Normal rate, regular rhythm, normal heart sounds and intact distal pulses. Pulmonary/Chest: Effort normal and breath sounds normal. No respiratory distress. Right breast exhibits no mass, no nipple discharge, no skin change and no tenderness. Left breast exhibits no mass, no nipple discharge, no skin change and no tenderness. Breasts are symmetrical.   Abdominal: Soft. Bowel sounds are normal. She exhibits no distension and no mass. There is no tenderness. There is no guarding. Genitourinary: Uterus normal. Cervix exhibits no motion tenderness and no discharge. Right adnexum displays no mass and no tenderness. Left adnexum displays no mass and no tenderness. No tenderness in the vagina. Vaginal discharge (small amount white vaginal discharge no odor) found. Musculoskeletal: Normal range of motion. She exhibits no edema or deformity. Lymphadenopathy:     She has no cervical adenopathy. Neurological: She is alert and oriented to person, place, and time. No cranial nerve deficit.  Coordination normal.   Skin: Skin is warm and dry. Small skin tag each axilla, not bleeding or irritated, smooth, symmetric   Psychiatric: She has a normal mood and affect. Her behavior is normal. Judgment and thought content normal.   Nursing note and vitals reviewed. Assessment/Plan:       ICD-10-CM ICD-9-CM    1. Routine general medical examination at a health care facility Z00.00 V70.0 levonorgestrel-ethinyl estradiol (ENPRESSE) 50-30 (6)/75-40 (5)/125-30(10) tab      HPV, 16/18,45      TSH RFX ON ABNORMAL TO FREE T4      VITAMIN D, 25 HYDROXY      VITAMIN B12      FOLATE      CBC WITH AUTOMATED DIFF      METABOLIC PANEL, COMPREHENSIVE      LIPID PANEL   2. Acquired hypothyroidism E03.9 244.9 TSH RFX ON ABNORMAL TO FREE T4   Cont current meds   VITAMIN D, 25 HYDROXY      VITAMIN B12      FOLATE   3. Pap smear for cervical cancer screening   Z12.4 V76.2 HPV, 16/18,45   4. Major depression, recurrent, [facility] ([facility]    Cont current meds  Follow with Dr. Meeta Hunt   F33.42 296.36    5. Generalized anxiety disorder    Cont current meds  Follow with Dr. Meeta Hunt F41.1 300.02    6. Breast cancer screening     Z12.39 V76.10 [facility] BI SCREENING INCL CAD   7. Acute pain of right shoulder    voltaren PO ordered  Pt to let me know if know improvement or worsens   M25.511 719.41 diclofenac EC (VOLTAREN) 75 mg EC tablet   8. Skin tags, multiple acquired    Referral to [facility]- pt to call and schedule L91.8 701.9          Follow-up Disposition:  Return in about 6 months (around 2/1/2018), or if symptoms worsen or fail to improve, for hypothyroidism. Advised her to call back or return to office if symptoms worsen/change/persist.  Discussed expected course/resolution/complications of diagnosis in detail with patient. Medication risks/benefits/costs/interactions/alternatives discussed with patient. She was given an after visit summary which includes diagnoses, current medications, & vitals.   She expressed understanding with the diagnosis and plan.     Nanette Jewell, MERLYN

## 2017-08-01 NOTE — PATIENT INSTRUCTIONS
Pap Test: Care Instructions  Your Care Instructions  The Pap test (also called a Pap smear) is a screening test for cancer of the cervix, which is the lower part of the uterus that opens into the vagina. The test can help your doctor find early changes in the cells that could lead to cancer. The sample of cells taken during your test has been sent to a lab so that an expert can look at the cells. It usually takes a week or two to get the results back. Follow-up care is a key part of your treatment and safety. Be sure to make and go to all appointments, and call your doctor if you are having problems. It's also a good idea to know your test results and keep a list of the medicines you take. What do the results mean? · A normal result means that the test did not find any abnormal cells in the sample. · An abnormal result can mean many things. Most of these are not cancer. The results of your test may be abnormal because:  ¨ You have an infection of the vagina or cervix, such as a yeast infection. ¨ You have an IUD (intrauterine device for birth control). ¨ You have low estrogen levels after menopause that are causing the cells to change. ¨ You have cell changes that may be a sign of precancer or cancer. The results are ranked based on how serious the changes might be. There are many other reasons why you might not get a normal result. If the results were abnormal, you may need to get another test within a few weeks or months. If the results show changes that could be a sign of cancer, you may need a test called a colposcopy, which provides a more complete view of the cervix. Sometimes the lab cannot use the sample because it does not contain enough cells or was not preserved well. If so, you may need to have the test again. This is not common, but it does happen from time to time. When should you call for help?   Watch closely for changes in your health, and be sure to contact your doctor if:  · You have vaginal bleeding or pain for more than 2 days after the test. It is normal to have a small amount of bleeding for a day or two after the test.  Where can you learn more? Go to http://jason-francisco javier.info/. Enter E357 in the search box to learn more about \"Pap Test: Care Instructions. \"  Current as of: July 26, 2016  Content Version: 11.3  © 3961-6166 [employer]. Care instructions adapted under license by [employer] (which disclaims liability or warranty for this information). If you have questions about a medical condition or this instruction, always ask your healthcare professional. [address] any warranty or liability for your use of this information.

## 2017-08-03 ENCOUNTER — HOSPITAL ENCOUNTER (OUTPATIENT)
Dept: MAMMOGRAPHY | Age: 47
Discharge: HOME | End: 2017-08-03
Attending: NURSE PRACTITIONER
Payer: COMMERCIAL

## 2017-08-03 DIAGNOSIS — Z12.39 BREAST CANCER SCREENING: ICD-10-CM

## 2017-08-03 PROCEDURE — 77067 SCR MAMMO BI INCL CAD: CPT

## 2017-08-04 LAB
25(OH)D3+25(OH)D2 SERPL-MCNC: 53.2 NG/ML (ref 30–100)
ALBUMIN SERPL-MCNC: 4.1 G/DL (ref 3.5–5.5)
ALBUMIN/GLOB SERPL: 1.4 {RATIO} (ref 1.2–2.2)
ALP SERPL-CCNC: 50 IU/L (ref 39–117)
ALT SERPL-CCNC: 18 IU/L (ref 0–32)
AST SERPL-CCNC: 20 IU/L (ref 0–40)
BASOPHILS # BLD AUTO: 0 X10E3/UL (ref 0–0.2)
BASOPHILS NFR BLD AUTO: 1 %
BILIRUB SERPL-MCNC: 0.4 MG/DL (ref 0–1.2)
BUN SERPL-MCNC: 19 MG/DL (ref 6–24)
BUN/CREAT SERPL: 19 (ref 9–23)
CALCIUM SERPL-MCNC: 9.1 MG/DL (ref 8.7–10.2)
CHLORIDE SERPL-SCNC: 104 MMOL/L (ref 96–106)
CHOLEST SERPL-MCNC: 206 MG/DL (ref 100–199)
CO2 SERPL-SCNC: 26 MMOL/L (ref 18–29)
CREAT SERPL-MCNC: 0.99 MG/DL (ref 0.57–1)
EOSINOPHIL # BLD AUTO: 0.1 X10E3/UL (ref 0–0.4)
EOSINOPHIL NFR BLD AUTO: 2 %
ERYTHROCYTE [DISTWIDTH] IN BLOOD BY AUTOMATED COUNT: 13.4 % (ref 12.3–15.4)
FOLATE SERPL-MCNC: >20 NG/ML
GLOBULIN SER CALC-MCNC: 3 G/DL (ref 1.5–4.5)
GLUCOSE SERPL-MCNC: 89 MG/DL (ref 65–99)
HCT VFR BLD AUTO: 43.4 % (ref 34–46.6)
HDLC SERPL-MCNC: 100 MG/DL
HGB BLD-MCNC: 14.2 G/DL (ref 11.1–15.9)
IMM GRANULOCYTES # BLD: 0 X10E3/UL (ref 0–0.1)
IMM GRANULOCYTES NFR BLD: 0 %
INTERPRETATION, 910389: NORMAL
LDLC SERPL CALC-MCNC: 89 MG/DL (ref 0–99)
LYMPHOCYTES # BLD AUTO: 2 X10E3/UL (ref 0.7–3.1)
LYMPHOCYTES NFR BLD AUTO: 31 %
MCH RBC QN AUTO: 30.9 PG (ref 26.6–33)
MCHC RBC AUTO-ENTMCNC: 32.7 G/DL (ref 31.5–35.7)
MCV RBC AUTO: 95 FL (ref 79–97)
MONOCYTES # BLD AUTO: 0.3 X10E3/UL (ref 0.1–0.9)
MONOCYTES NFR BLD AUTO: 4 %
NEUTROPHILS # BLD AUTO: 4 X10E3/UL (ref 1.4–7)
NEUTROPHILS NFR BLD AUTO: 62 %
PLATELET # BLD AUTO: 283 X10E3/UL (ref 150–379)
POTASSIUM SERPL-SCNC: 4.5 MMOL/L (ref 3.5–5.2)
PROT SERPL-MCNC: 7.1 G/DL (ref 6–8.5)
RBC # BLD AUTO: 4.59 X10E6/UL (ref 3.77–5.28)
SODIUM SERPL-SCNC: 143 MMOL/L (ref 134–144)
TRIGL SERPL-MCNC: 84 MG/DL (ref 0–149)
TSH SERPL DL<=0.005 MIU/L-ACNC: 1.51 UIU/ML (ref 0.45–4.5)
VIT B12 SERPL-MCNC: 450 PG/ML (ref 211–946)
VLDLC SERPL CALC-MCNC: 17 MG/DL (ref 5–40)
WBC # BLD AUTO: 6.5 X10E3/UL (ref 3.4–10.8)

## 2017-08-07 DIAGNOSIS — Z12.4 PAP SMEAR FOR CERVICAL CANCER SCREENING: Primary | ICD-10-CM

## 2017-08-28 ENCOUNTER — OFFICE VISIT (OUTPATIENT)
Dept: OBGYN CLINIC | Age: 47
End: 2017-08-28

## 2017-08-28 VITALS
HEIGHT: 64 IN | HEART RATE: 80 BPM | BODY MASS INDEX: 25.44 KG/M2 | RESPIRATION RATE: 16 BRPM | TEMPERATURE: 98.3 F | WEIGHT: 149 LBS | DIASTOLIC BLOOD PRESSURE: 92 MMHG | SYSTOLIC BLOOD PRESSURE: 136 MMHG

## 2017-08-28 DIAGNOSIS — B97.7 HPV IN FEMALE: Primary | ICD-10-CM

## 2017-09-26 ENCOUNTER — OFFICE VISIT (OUTPATIENT)
Dept: DERMATOLOGY | Facility: AMBULATORY SURGERY CENTER | Age: 47
End: 2017-09-26

## 2017-09-26 VITALS
RESPIRATION RATE: 18 BRPM | DIASTOLIC BLOOD PRESSURE: 80 MMHG | TEMPERATURE: 98.8 F | HEART RATE: 86 BPM | BODY MASS INDEX: 24.75 KG/M2 | SYSTOLIC BLOOD PRESSURE: 124 MMHG | OXYGEN SATURATION: 98 % | HEIGHT: 64 IN | WEIGHT: 145 LBS

## 2017-09-26 DIAGNOSIS — D18.01 CHERRY ANGIOMA: ICD-10-CM

## 2017-09-26 DIAGNOSIS — L82.1 SEBORRHEIC KERATOSES: ICD-10-CM

## 2017-09-26 DIAGNOSIS — L91.8 CUTANEOUS SKIN TAGS: ICD-10-CM

## 2017-09-26 DIAGNOSIS — L81.4 LENTIGINES: ICD-10-CM

## 2017-09-26 DIAGNOSIS — D22.9 MULTIPLE BENIGN NEVI: Primary | ICD-10-CM

## 2017-09-26 RX ORDER — NEOMYCIN SULFATE, POLYMYXIN B SULFATE AND HYDROCORTISONE 10; 3.5; 1 MG/ML; MG/ML; [USP'U]/ML
SUSPENSION/ DROPS AURICULAR (OTIC)
COMMUNITY
Start: 2017-07-01 | End: 2018-08-28

## 2017-09-26 NOTE — MR AVS SNAPSHOT
Visit Information Date & Time Provider Department Dept. Phone Encounter #  
 9/26/2017  3:00 PM Nanci Daniel NP [facility] [address] [phone] [id number] Your Appointments 9/26/2017  3:00 PM  
Office Visit with CATIA Harrisonsai [address] [facility]) Appt Note: np - full skin exam - also possible skin tag removal - $150  
 [facility] [place] [address] [address] [facility] [address] [place] [id number]  
  
    
 11/13/2017  3:30 PM  
ESTABLISHED PATIENT with Kennith Halsted, MD  
[facility] [address] [facility]) Appt Note:   
 [address] [facility] [address] [place] [address]  
[phone] [address] [place] [address] Upcoming Health Maintenance Date Due INFLUENZA AGE 9 TO ADULT 10/26/2017* PAP AKA CERVICAL CYTOLOGY 8/1/2020 DTaP/Tdap/Td series (2 - Td) 7/10/2025 *Topic was postponed. The date shown is not the original due date. Allergies as of 9/26/2017  Review Complete On: 9/26/2017 By: Dwayne Fritz Severity Noted Reaction Type Reactions Pcn [Penicillins]  02/28/2011    Rash Current Immunizations  Reviewed on 7/10/2015 Name Date Influenza Vaccine 10/16/2015, 12/2/2014, 10/1/2013 Influenza Vaccine Whole 10/1/2010 TD Vaccine 2/28/2008 Tdap 7/10/2015 Not reviewed this visit Vitals BP Pulse Temp Resp Height(growth percentile) Weight(growth percentile) 124/80 (BP 1 Location: Right arm, BP Patient Position: Sitting) 86 98.8 °F (37.1 °C) (Oral) 18 5' 3.78\" (1.62 m) 145 lb (65.8 kg) LMP SpO2 BMI OB Status Smoking Status 08/08/2017 (Exact Date) 98% 25.06 kg/m2 Having regular periods Never Smoker Vitals History BMI and BSA Data Body Mass Index Body Surface Area 25.06 kg/m 2 1.72 m 2 Preferred Pharmacy Pharmacy Name Phone  [address] [phone] Your Updated Medication List  
  
   
This list is accurate as of: 9/26/17  2:51 PM.  Always use your most recent med list.  
  
  
  
  
 Biotin 2,500 mcg Cap Take  by mouth. buPROPion  mg XL tablet Commonly known as:  Gerilyulises Benders Take 1 Tab by mouth every morning. cholecalciferol 1,000 unit tablet Commonly known as:  VITAMIN D3 Take  by mouth daily. cyanocobalamin 1,000 mcg sublingual tablet Commonly known as:  VITAMIN B-12 Take 1,000 mcg by mouth daily. diclofenac EC 75 mg EC tablet Commonly known as:  VOLTAREN Take 1 Tab by mouth two (2) times daily as needed. Indications: shoulder pain  
  
 escitalopram oxalate 20 mg tablet Commonly known as:  Fredick Herring Take 1 Tab by mouth daily. * levonorgestrel-ethinyl estradiol 50-30 (6)/75-40 (5)/125-30(10) Tab Commonly known as:  ENPRESSE Take 1 Tab by mouth daily. Indications: POSTCOITAL CONTRACEPTION  
  
 * MYZILRA 50-30 (6)/75-40 (5)/125-30(10) Tab Generic drug:  levonorgestrel-ethinyl estradiol TAKE 1 TABLET DAILY  
  
 multivitamin tablet Commonly known as:  ONE A DAY Take 1 Tab by mouth daily. neomycin-polymyxin-hydrocortisone (buffered) 3.5-10,000-1 mg/mL-unit/mL-% otic suspension Commonly known as:  Manju Gin SYNTHROID 137 mcg tablet Generic drug:  levothyroxine Take  by mouth Daily (before breakfast). zolpidem 10 mg tablet Commonly known as:  AMBIEN Take 1 Tab by mouth nightly as needed for Sleep. * Notice: This list has 2 medication(s) that are the same as other medications prescribed for you. Read the directions carefully, and ask your doctor or other care provider to review them with you. Patient Instructions Self Skin Exam and Sunscreens Early detection and treatment is essential in the treatment of all forms of skin cancer. If caught early, all forms of skin cancer are curable. In addition to your regular visits, you should perform a monthly skin examination. Over time, you become familiar with what is normally found on your skin and can identify new or suspicious spots. One of the screening tools you can use to assess your skin is to follow the ABCDEs: 
 
A= Asymmetry (One half is unlike the other half) B= Border (An irregular, scalloped or poorly defined edge) C= Color (Is varied from one area to another, has shades of tan, brown/ black,       white, red or blue) D= Diameter (Spots larger than 6mm or a pencil eraser) E= Evolving (New spots or one that is changing in size, shape, or color) A follow- up interval will be customized based on your history of skin cancer or level of skin damage and risk factors. In any case, if you notice a suspicious or new spot, an appointment should be arranged between regular visits. Everyone should use sunscreen and sun-safe practices, which is especially important for those with a personal or family history of skin cancer. Suggestions for this include: 1. Use daily moisturizers containing SPF 30 or higher. 2. Wear long sleeve clothing with UPF ratings and a broad-brimmed hat. 3. Apply sunscreen with SPF 30 or higher to all sun exposed areas if you are going to be in the sun. A broad spectrum UVA/ UVB sunscreen is best.  Dont forget to REAPPLY every two hours or more often if swimming or sweating! 4. Avoid outside activities during peak sun hours, especially in the summer (10am- 2pm). 5. DO NOT use tanning beds. Using sunscreen and sun-safe practices can help reduce the likelihood of developing skin cancer or additional skin cancers in those previously diagnosed. Introducing [facility]! Dear Cade Boards: Thank you for requesting a DATAllegrot account.   Our records indicate that you already have an active DropMat account. You can access your account anytime at [email or web address] Did you know that you can access your hospital and ER discharge instructions at any time in DropMat? You can also review all of your test results from your hospital stay or ER visit. Additional Information If you have questions, please visit the Frequently Asked Questions section of the DropMat website at [email or web address] Remember, DropMat is NOT to be used for urgent needs. For medical emergencies, dial 911. Now available from your iPhone and Android! Please provide this summary of care documentation to your next provider. Your primary care clinician is listed as Taina Price. If you have any questions after today's visit, please call [phone].

## 2017-09-26 NOTE — PROGRESS NOTES
Chief Complaint   Patient presents with    Skin Exam     new patient     1. Have you been to the ER, urgent care clinic since your last visit? Hospitalized since your last visit? No    2. Have you seen or consulted any other health care providers outside of the [address] [place] [address] since your last visit? Include any pap smears or colon screening.  No

## 2017-09-26 NOTE — PROGRESS NOTES
Name: Niall Klein       Age: 52 y.o. Date: 9/26/2017    Chief Complaint:   Chief Complaint   Patient presents with    Skin Exam     new patient       Subjective:    HPI  Ms. Niall Klein is a 52 y.o. female who presents as a new patient to [facility] for a skin exam.  The patient was referred for this evaluation by her PCP. The patient has not had a skin exam in the past and the patient does have current complaints related to her skin. She reports a lesion that came up quick a few months ago on the left thigh and then peeled off. She reports a skin tag in each axilla. The patient's pertinent skin history includes: no personal history of skin cancer but states her mother and father have had lesions removed. She denies a family history of melanoma. Prior tanning bed user but now gets spray tans. ROS: Constitutional: Negative. Dermatological : positive for - skin lesion changes      Social History     Social History    Marital status: LEGALLY      Spouse name: .      Number of children: 0    Years of education: college     Occupational History    [place] [id number]     Social History Main Topics    Smoking status: Never Smoker    Smokeless tobacco: Never Used    Alcohol use 0.6 oz/week     1 Standard drinks or equivalent per week      Comment: 1 drink most weeks    Drug use: No    Sexual activity: Yes     Partners: Male     Birth control/ protection: Pill     Other Topics Concern    Exercise No     not exercising      Social History Narrative       Family History   Problem Relation Age of Onset    Hypertension Father     Cancer Father      AML    Arthritis-osteo Mother      TKR    Hypertension Mother     Bleeding Prob Maternal Aunt 70    Breast Cancer Maternal Aunt      46s    Cancer Maternal Grandmother      stomach    Breast Cancer Cousin 39       Past Medical History:   Diagnosis Date    Abnormal Papanicolaou smear of cervix     Depression with anxiety 1995    h/o psychiatric hosp 2013 ([place])    Family history of skin cancer     History of migraine     preivoulsy required Botox    Sun-damaged skin     childhood and young adult    Sunburn, blistering     childhood    Tanning bed exposure     Unspecified hypothyroidism        Past Surgical History:   Procedure Laterality Date    HX BREAST AUGMENTATION  2006    bilateral    HX LEEP PROCEDURE  2004    HX UROLOGICAL      urethral dilation 11years of age   [facility] IMPLANT BREAST SILICONE/EQ Bilateral     2006 [place]    DUSTIN MAMMOGRAM DIGITAL BILATERAL  5/13       Current Outpatient Prescriptions   Medication Sig Dispense Refill    MYZILRA 50-30 (6)/75-40 (5)/125-30(10) tab TAKE 1 TABLET DAILY 84 Tab 2    diclofenac EC (VOLTAREN) 75 mg EC tablet Take 1 Tab by mouth two (2) times daily as needed. Indications: shoulder pain 30 Tab 0    buPROPion XL (WELLBUTRIN XL) 300 mg XL tablet Take 1 Tab by mouth every morning. 90 Tab 1    escitalopram oxalate (LEXAPRO) 20 mg tablet Take 1 Tab by mouth daily. 90 Tab 1    zolpidem (AMBIEN) 10 mg tablet Take 1 Tab by mouth nightly as needed for Sleep. 90 Tab 1    cholecalciferol (VITAMIN D3) 1,000 unit tablet Take  by mouth daily.  cyanocobalamin (VITAMIN B-12) 1,000 mcg sublingual tablet Take 1,000 mcg by mouth daily.  Biotin 2,500 mcg cap Take  by mouth.  multivitamin (ONE A DAY) tablet Take 1 Tab by mouth daily.  levothyroxine (SYNTHROID) 137 mcg tablet Take  by mouth Daily (before breakfast).  neomycin-polymyxin-hydrocortisone, buffered, (PEDIOTIC) 3.5-10,000-1 mg/mL-unit/mL-% otic suspension       levonorgestrel-ethinyl estradiol (ENPRESSE) 50-30 (6)/75-40 (5)/125-30(10) tab Take 1 Tab by mouth daily.  Indications: POSTCOITAL CONTRACEPTION 84 Tab 3       Allergies   Allergen Reactions    Pcn [Penicillins] Rash         Objective:    Visit Vitals    /80 (BP 1 Location: Right arm, BP Patient Position: Sitting)  Pulse 86    Temp 98.8 °F (37.1 °C) (Oral)    Resp 18    Ht 5' 3.78\" (1.62 m)    Wt 65.8 kg (145 lb)    LMP 08/08/2017 (Exact Date)    SpO2 98%    BMI 25.06 kg/m2       Jaycob Aragon is a 52 y.o. female who appears well and in no distress. She is awake, alert, and oriented. There is no preauricular, submandibular, or cervical lymphadenopathy. A skin examination was performed including her scalp, face (including eyelid), ears, neck, chest, back, abdomen, upper extremities (including digits/nails), lower extremities, breasts, buttocks; genital skin was not examined. She has lentigines on sun exposed areas. There are scattered waxy macules and stuck on papules consistent with seborrheic keratoses. There are scattered cherry angiomas. She has medium brown junctional nevi without concerning features. She skin tags on the right side of her neck ×3 and one in each axilla. These are skin tone and without concerning features. Assessment/Plan: 1. Normal nevi. The diagnosis of normal nevi was reviewed. I discussed sun protection, sunscreen use, the warning signs of skin cancer, the need for self-skin examinations, and the need for regular practitioner exams every 2 years. The patient should follow up sooner as needed if new, changing, or symptomatic skin lesions arise. 2.Seborrheic keratoses. The diagnosis was reviewed and the patient was reassured that no treatment is needed for these benign lesions. 3. Cherry angiomas. The diagnosis was reviewed and the patient was reassured that no treatment is needed for these benign lesions. 4. Cutaneous skin tags. The diagnosis discussed and options for removal reviewed. The two in the axillae were removed via snip technique. The three on the right neck were treated with cryotherapy, care reviewed. 5. Solar lentigos. The diagnosis and relationship to sun exposure was reviewed. Sun protection advised.

## 2017-10-11 ENCOUNTER — PATIENT MESSAGE (OUTPATIENT)
Dept: INTERNAL MEDICINE CLINIC | Age: 47
End: 2017-10-11

## 2017-11-01 DIAGNOSIS — M25.511 ACUTE PAIN OF RIGHT SHOULDER: ICD-10-CM

## 2017-11-01 NOTE — TELEPHONE ENCOUNTER
Last office visit- 8/1/17  Next office visit- No Upcoming   Last Refill- 8/1/17    Requested Prescriptions     Pending Prescriptions Disp Refills    diclofenac EC (VOLTAREN) 75 mg EC tablet 30 Tab 0     Sig: Take 1 Tab by mouth two (2) times daily as needed.  Indications: shoulder pain

## 2017-11-02 RX ORDER — DICLOFENAC SODIUM 75 MG/1
75 TABLET, DELAYED RELEASE ORAL
Qty: 30 TAB | Refills: 0 | Status: SHIPPED | OUTPATIENT
Start: 2017-11-02 | End: 2018-08-28

## 2017-11-13 ENCOUNTER — OFFICE VISIT (OUTPATIENT)
Dept: BEHAVIORAL/MENTAL HEALTH CLINIC | Age: 47
End: 2017-11-13

## 2017-11-13 VITALS
BODY MASS INDEX: 26.12 KG/M2 | HEIGHT: 64 IN | SYSTOLIC BLOOD PRESSURE: 113 MMHG | WEIGHT: 153 LBS | DIASTOLIC BLOOD PRESSURE: 64 MMHG | HEART RATE: 89 BPM

## 2017-11-13 DIAGNOSIS — F33.42 MAJOR DEPRESSION, RECURRENT, FULL REMISSION (HCC): ICD-10-CM

## 2017-11-13 DIAGNOSIS — F32.5 MAJOR DEPRESSION IN COMPLETE REMISSION (HCC): ICD-10-CM

## 2017-11-13 DIAGNOSIS — F41.1 GENERALIZED ANXIETY DISORDER: Primary | ICD-10-CM

## 2017-11-13 RX ORDER — ESCITALOPRAM OXALATE 20 MG/1
20 TABLET ORAL DAILY
Qty: 90 TAB | Refills: 1 | Status: SHIPPED | OUTPATIENT
Start: 2017-11-13 | End: 2018-06-06 | Stop reason: SDUPTHER

## 2017-11-13 RX ORDER — BUPROPION HYDROCHLORIDE 300 MG/1
300 TABLET ORAL
Qty: 90 TAB | Refills: 1 | Status: SHIPPED | OUTPATIENT
Start: 2017-11-13 | End: 2018-06-06 | Stop reason: SDUPTHER

## 2017-11-13 RX ORDER — ZOLPIDEM TARTRATE 10 MG/1
10 TABLET ORAL
Qty: 90 TAB | Refills: 1 | Status: SHIPPED | OUTPATIENT
Start: 2017-11-13 | End: 2018-06-06

## 2017-11-13 NOTE — PROGRESS NOTES
Psychiatric Outpatient Progress Note    Date: 11/13/2017  Account Number:  [id number]  Name: Magail Padilla    Length of psychotherapy session: 20 minutes       SUBJECTIVE:   Magali Padilla  is a 52 y.o.  female  patient presents for a therapy/psychopharmacological management appointment. Ms. Edawrd Pompa presents to the appointment reporting stable mood but increased worry because she is  at the end of her severance, inability find a job. Continued distress related to her mother's severe depression, parkinson's disease and obstinance. She is sleeping well at night,  good appetite. She denies any feelings of depression or SI. Primary stressor- mother and work. PFSHx: no changes  ROS:   Appetite:good , Sleep: much improved; A review of neurological, orthopedic, HEENT, Constitutional systems were within normal limits.     Weight gain- 160.6lbs    Response to Treatment: improving  Side Effects: denied    Supportive/Cognitive/Reality-Oriented psychotherapy provided in regards to psychosocial stressors:   Current problems   Housing issues   Occupational issues- unemployed; looking for work   Academic issues   Legal issues   Medical issues   Interpersonal conflicts  Psychoeducation provided  Treatment plan reviewed with patient-including diagnosis and medications  Worked on issues of denial & effects of substance dependency/use      OBJECTIVE:                 Mental Status exam: WNL except for      Sensorium  oriented to time, place and person   Relations cooperative    Eye Contact    appropriate   Appearance:  age appropriate and casually dressed   Motor Behavior:  gait stable   Speech:  normal pitch and normal volume   Thought Process: goal directed and within normal limits   Thought Content free of delusions and free of hallucinations   Suicidal ideations none   Homicidal ideations none   Mood:   stable, improved   Affect:   stable, improved   Memory recent  adequate   Memory remote:  adequate   Concentration: adequate   Abstraction:  abstract   Insight:  good   Reliability good   Judgment:  good       Allergies   Allergen Reactions    Pcn [Penicillins] Rash        Current Outpatient Prescriptions   Medication Sig Dispense Refill    diclofenac EC (VOLTAREN) 75 mg EC tablet Take 1 Tab by mouth two (2) times daily as needed. Indications: shoulder pain 30 Tab 0    neomycin-polymyxin-hydrocortisone, buffered, (PEDIOTIC) 3.5-10,000-1 mg/mL-unit/mL-% otic suspension       MYZILRA 50-30 (6)/75-40 (5)/125-30(10) tab TAKE 1 TABLET DAILY 84 Tab 2    buPROPion XL (WELLBUTRIN XL) 300 mg XL tablet Take 1 Tab by mouth every morning. 90 Tab 1    escitalopram oxalate (LEXAPRO) 20 mg tablet Take 1 Tab by mouth daily. 90 Tab 1    zolpidem (AMBIEN) 10 mg tablet Take 1 Tab by mouth nightly as needed for Sleep. 90 Tab 1    cholecalciferol (VITAMIN D3) 1,000 unit tablet Take  by mouth daily.  cyanocobalamin (VITAMIN B-12) 1,000 mcg sublingual tablet Take 1,000 mcg by mouth daily.  Biotin 2,500 mcg cap Take  by mouth.  multivitamin (ONE A DAY) tablet Take 1 Tab by mouth daily.  levothyroxine (SYNTHROID) 137 mcg tablet Take  by mouth Daily (before breakfast). MEDICAL DECISION MAKING    Data: pertinent labs, imaging, medical records and diagnostic tests reviewed and incorporated in diagnosis and treatment plan    Diagnoses/ Problems:   1. Depression    Improving, stable  2. Generalized Anxiety Disorder    Improving, stable    Assessment:  Perry Wills  is a 52 y.o.  female patient presents with improving depression and anxiety. Risk Scoring- chronic illnesses and prescription drug management    Treatment Plan:  1. Medications:     Continue Wellbutrin, Lexapro 20mg daily        No orders of the defined types were placed in this encounter.           The following regarding medications was addressed:    (The risks and benefits of the proposed medication; the potential medication side effects ie    dry mouth, weight gain, GI upset, headache; patient given opportunity to ask questions)       Medication Changes/Adjustments:   Medications Discontinued During This Encounter   Medication Reason    levonorgestrel-ethinyl estradiol (ENPRESSE) 50-30 (6)/75-40 (5)/125-30(10) tab Discontinued by Another Clinician      2. Provided supportive psychotherapy: addressed safety risk, recent stressors, provided validation and assisted with coping skills and problem solving  3. Labs/ tests/ old records/ collateral will be obtained  4.   Follow-up Disposition: Not on File

## 2017-11-13 NOTE — MR AVS SNAPSHOT
Visit Information Date & Time Provider Department Dept. Phone Encounter #  
 11/13/2017  3:30 PM Stephanie Yang MD [address] [facility] [phone] [id number] Upcoming Health Maintenance Date Due  
 PAP AKA CERVICAL CYTOLOGY 8/1/2020 DTaP/Tdap/Td series (2 - Td) 7/10/2025 Allergies as of 11/13/2017  Review Complete On: 11/13/2017 By: Stephanie Yang MD  
  
 Severity Noted Reaction Type Reactions Pcn [Penicillins]  02/28/2011    Rash Current Immunizations  Reviewed on 7/10/2015 Name Date Influenza Vaccine 10/4/2017, 10/16/2015, 12/2/2014, 10/1/2013 Influenza Vaccine Whole 10/1/2010 TD Vaccine 2/28/2008 Tdap 7/10/2015 Not reviewed this visit You Were Diagnosed With   
  
 Codes Comments Generalized anxiety disorder    -  Primary ICD-10-CM: F41.1 ICD-9-CM: 300.02 Major depression, recurrent, full remission ([facility].)     ICD-10-CM: F33.42 
ICD-9-CM: 296.36 Major depression in complete remission ([facility] 75.)     ICD-10-CM: F32.5 ICD-9-CM: 296.26 Vitals BP Pulse Height(growth percentile) Weight(growth percentile) LMP BMI  
 113/64 89 5' 3.78\" (1.62 m) 153 lb (69.4 kg) 10/26/2017 26.44 kg/m2 OB Status Smoking Status Having regular periods Never Smoker Vitals History BMI and BSA Data Body Mass Index Body Surface Area  
 26.44 kg/m 2 1.77 m 2 Preferred Pharmacy Pharmacy Name Phone  [facility] [phone] Your Updated Medication List  
  
   
This list is accurate as of: 11/13/17  3:52 PM.  Always use your most recent med list.  
  
  
  
  
 Biotin 2,500 mcg Cap Take  by mouth. buPROPion  mg XL tablet Commonly known as:  Melly Yolanda Take 1 Tab by mouth every morning. cholecalciferol 1,000 unit tablet Commonly known as:  VITAMIN D3 Take  by mouth daily. cyanocobalamin 1,000 mcg sublingual tablet Commonly known as:  VITAMIN B-12 Take 1,000 mcg by mouth daily. diclofenac EC 75 mg EC tablet Commonly known as:  VOLTAREN Take 1 Tab by mouth two (2) times daily as needed. Indications: shoulder pain  
  
 escitalopram oxalate 20 mg tablet Commonly known as:  Shira Skates Take 1 Tab by mouth daily. multivitamin tablet Commonly known as:  ONE A DAY Take 1 Tab by mouth daily. MYZILRA 50-30 (6)/75-40 (5)/125-30(10) Tab Generic drug:  levonorgestrel-ethinyl estradiol TAKE 1 TABLET DAILY  
  
 neomycin-polymyxin-hydrocortisone (buffered) 3.5-10,000-1 mg/mL-unit/mL-% otic suspension Commonly known as:  Kathrene Sep SYNTHROID 137 mcg tablet Generic drug:  levothyroxine Take  by mouth Daily (before breakfast). zolpidem 10 mg tablet Commonly known as:  AMBIEN Take 1 Tab by mouth nightly as needed for Sleep. Prescriptions Printed Refills  
 zolpidem (AMBIEN) 10 mg tablet 1 Sig: Take 1 Tab by mouth nightly as needed for Sleep. Class: Print Route: Oral  
  
Prescriptions Sent to Pharmacy Refills buPROPion XL (WELLBUTRIN XL) 300 mg XL tablet 1 Sig: Take 1 Tab by mouth every morning. Class: Normal  
 Pharmacy: [place] [address] Ph #: [phone] Route: Oral  
 escitalopram oxalate (LEXAPRO) 20 mg tablet 1 Sig: Take 1 Tab by mouth daily. Class: Normal  
 Pharmacy: [place] [address] Ph #: [phone] Route: Oral  
  
Introducing [facility]! Dear Jimi Em: Thank you for requesting a C-Vibes account. Our records indicate that you already have an active C-Vibes account. You can access your account anytime at [email or web address] Did you know that you can access your hospital and ER discharge instructions at any time in C-Vibes? You can also review all of your test results from your hospital stay or ER visit. Additional Information If you have questions, please visit the Frequently Asked Questions section of the Cabe na Malahart website at [email or web address] Remember, Classting is NOT to be used for urgent needs. For medical emergencies, dial 911. Now available from your iPhone and Android! Please provide this summary of care documentation to your next provider. Your primary care clinician is listed as Priyanka Carter. If you have any questions after today's visit, please call [phone].

## 2018-02-02 ENCOUNTER — OFFICE VISIT (OUTPATIENT)
Dept: OBGYN CLINIC | Age: 48
End: 2018-02-02

## 2018-02-02 VITALS
DIASTOLIC BLOOD PRESSURE: 86 MMHG | SYSTOLIC BLOOD PRESSURE: 144 MMHG | BODY MASS INDEX: 26.43 KG/M2 | HEIGHT: 64 IN | WEIGHT: 154.8 LBS

## 2018-02-02 DIAGNOSIS — T19.2XXA RETAINED TAMPON, INITIAL ENCOUNTER: Primary | ICD-10-CM

## 2018-02-02 NOTE — PROGRESS NOTES
Vaginitis    Kiya Morales is a 50 y.o. female who presents for the evaluation of vaginal discharge and odor. Reports sex felt uncomfortable on Sunday, then she noticed malodorous discharge and irritation on Monday. Odor and irritation has persisted. Associated symptoms:   Pruritis? denies  Irritation? internally  Dysuria? denies  Bleeding/spotting? Spotting since menses last Wednesday  Pain? denies  Lesions? denies    Hygeine:  -new soaps or detergents? denies  -new clothing? denies  -douching or hygiene products? denies    Risk factors:  -sexually active? 1 monogamous partner  -# of partners 1  -gender of partners male  -uses condoms or barrier contraception:  never  -known exposure? no    Previous STD history: none    Prior yeast infx or BV? no      Past Medical History:   Diagnosis Date    Abnormal Papanicolaou smear of cervix     Depression with anxiety 1995    h/o psychiatric hosp 2013 ()    Family history of skin cancer     History of migraine     preivoulsy required Botox    Sun-damaged skin     childhood and young adult    Sunburn, blistering     childhood    Tanning bed exposure     Unspecified hypothyroidism         Past Surgical History:   Procedure Laterality Date    HX BREAST AUGMENTATION  2006    bilateral    HX LEEP PROCEDURE  2004    HX UROLOGICAL      urethral dilation 11years of age   Candido Danuta IMPLANT BREAST SILICONE/EQ Bilateral     2006 [place]    DUSTIN MAMMOGRAM DIGITAL BILATERAL  5/13       Family History   Problem Relation Age of Onset    Hypertension Father     Cancer Father      AML    Arthritis-osteo Mother      TKR    Hypertension Mother     Parkinson's Disease Mother     Bleeding Prob Maternal Aunt 70    Breast Cancer Maternal Aunt      46s    Cancer Maternal Grandmother      stomach    Breast Cancer Cousin 39       Social History     Social History    Marital status: LEGALLY      Spouse name: .      Number of children: 0    Years of education: [employer]     Occupational History   [employer] [address] [employer]     Social History Main Topics    Smoking status: Never Smoker    Smokeless tobacco: Never Used    Alcohol use 0.6 oz/week     1 Standard drinks or equivalent per week      Comment: 1 drink most weeks    Drug use: No    Sexual activity: Yes     Partners: Male     Birth control/ protection: Pill     Other Topics Concern    Exercise No     not exercising      Social History Narrative       Current Outpatient Prescriptions   Medication Sig Dispense Refill    buPROPion XL (WELLBUTRIN XL) 300 mg XL tablet Take 1 Tab by mouth every morning. 90 Tab 1    escitalopram oxalate (LEXAPRO) 20 mg tablet Take 1 Tab by mouth daily. 90 Tab 1    zolpidem (AMBIEN) 10 mg tablet Take 1 Tab by mouth nightly as needed for Sleep. 90 Tab 1    MYZILRA 50-30 (6)/75-40 (5)/125-30(10) tab TAKE 1 TABLET DAILY 84 Tab 2    levothyroxine (SYNTHROID) 137 mcg tablet Take  by mouth Daily (before breakfast).  diclofenac EC (VOLTAREN) 75 mg EC tablet Take 1 Tab by mouth two (2) times daily as needed. Indications: shoulder pain 30 Tab 0    neomycin-polymyxin-hydrocortisone, buffered, (PEDIOTIC) 3.5-10,000-1 mg/mL-unit/mL-% otic suspension       cholecalciferol (VITAMIN D3) 1,000 unit tablet Take  by mouth daily.  cyanocobalamin (VITAMIN B-12) 1,000 mcg sublingual tablet Take 1,000 mcg by mouth daily.  Biotin 2,500 mcg cap Take  by mouth.  multivitamin (ONE A DAY) tablet Take 1 Tab by mouth daily.          Allergies   Allergen Reactions    Pcn [Penicillins] Rash         Review of Systems - History obtained from the patient  Constitutional: negative for weight loss, fever, night sweats  HEENT: negative for hearing loss, earache, congestion, snoring, sorethroat  CV: negative for chest pain, palpitations, edema  Resp: negative for cough, shortness of breath, wheezing  Breast: negative for breast lumps, nipple discharge, galactorrhea  GI: negative for change in bowel habits, abdominal pain, black or bloody stools  : negative for frequency, dysuria, hematuria, +vaginal discharge, irritation, odor  MSK: negative for back pain, joint pain, muscle pain  Skin: negative for itching, rash, hives  Neuro: negative for dizziness, headache, confusion, weakness  Psych: negative for anxiety, depression, change in mood  Heme/lymph: negative for bleeding, bruising, pallor      Objective:    Visit Vitals    /86 (BP 1 Location: Left arm, BP Patient Position: Sitting)    Ht 5' 3.78\" (1.62 m)    Wt 154 lb 12.8 oz (70.2 kg)    LMP 01/24/2018 (Exact Date)    BMI 26.75 kg/m2       PHYSICAL EXAMINATION    Constitutional  · Appearance: well-nourished, well developed, alert, in no acute distress    HENT  · Head and Face: appears normal    Gastrointestinal  · Abdominal Examination: abdomen non-tender to palpation, normal bowel sounds, no masses present  · Liver and spleen: no hepatomegaly present, spleen not palpable  · Hernias: no hernias identified    Genitourinary  · External Genitalia: normal appearance for age,no tenderness present, no inflammatory lesions present, no masses present, no atrophy present  · Vagina: normal vaginal vault without central or paravaginal defects, no inflammatory lesions present, no masses present, +vaginal discharge with odor, +retained tampon, removed with ring forceps  · Bladder: non-tender to palpation  · Urethra: appears normal  · Cervix: normal, no cervicitis, no CMT  · Uterus: normal size, shape and consistency, mobile  · Adnexa: no adnexal tenderness present, no adnexal masses present  · Perineum: perineum within normal limits, no evidence of trauma, no rashes or skin lesions present    Skin  · General Inspection: no rash, no lesions identified    Neurologic/Psychiatric  · Mental Status:  · Orientation: grossly oriented to person, place and time  · Mood and Affect: mood normal, affect appropriate      Assessment/Plan:    50 y.o. female with retained tampon, removed without difficulty in office today.     RTO: for annual exam and repeat pap in August    Katherin Al MD  2/2/2018  12:01 PM

## 2018-02-02 NOTE — PATIENT INSTRUCTIONS
Pelvic Exam: Care Instructions  Your Care Instructions    When your doctor examines all of your pelvic organs, it's called a pelvic exam. Two good reasons to have this kind of exam are to check for sexually transmitted infections (STIs) and to get a Pap test. A Pap test is also called a Pap smear. It checks for early changes that can lead to cancer of the cervix. Sometimes a pelvic exam is part of a regular checkup. In this case, you can do some things to make your test results as accurate as possible. · Try to schedule the exam when you don't have your period. · Don't use douches, tampons, or vaginal medicines, sprays, or powders for 24 hours before your exam.  · Don't have sex for 24 hours before your exam.  Other times, women have this kind of exam at any time of the month. This is because they have pelvic pain, bleeding, or discharge. Or they may have another pelvic problem. Before your exam, it's important to share some information with your doctor. For example, if you are a survivor of rape or sexual abuse, you can talk about any concerns you may have. Your doctor will also want to know if you are pregnant or use birth control. And he or she will want to hear about any problems, surgeries, or procedures you have had in your pelvic area. You will also need to tell your doctor when your last period was. Follow-up care is a key part of your treatment and safety. Be sure to make and go to all appointments, and call your doctor if you are having problems. It's also a good idea to know your test results and keep a list of the medicines you take. How is a pelvic exam done? · During a pelvic exam, you will:  ¨ Take off your clothes below the waist. You will get a paper or cloth cover to put over the lower half of your body. Jeet Vaen on your back on an exam table. Your feet will be raised above you. Stirrups will support your feet. · The doctor will:  Clarisse Lovelace you to relax your knees.  Your knees need to lean out, toward the walls. ¨ Check the opening of your vagina for sores or swelling. ¨ Gently put a tool called a speculum into your vagina. It opens the vagina a little bit. You will feel some pressure. But if you are relaxed, it will not hurt. It lets your doctor see inside the vagina. ¨ Use a small brush, spatula, or swab to get a sample of cells, if you are having a Pap test or culture. The doctor then removes the speculum. ¨ Put on gloves and put one or two fingers of one hand into your vagina. The other hand goes on your lower belly. This lets your doctor feel your pelvic organs. You will probably feel some pressure. Try to stay relaxed. ¨ Put one gloved finger into your rectum and one into your vagina, if needed. This can also help check your pelvic organs. This exam takes about 10 minutes. At the end, you will get a washcloth or tissue to clean your vaginal area. It's normal to have some discharge after this exam. You can then get dressed. Some test results may be ready right away. But results from a culture or a Pap test may take several days or a few weeks. Why should you have a pelvic exam?  · You want to have recommended screening tests. This includes a Pap test.  · You think you have a vaginal infection. Signs include itching, burning, or unusual discharge. · You might have been exposed to a sexually transmitted infection (STI), such as chlamydia or herpes. · You have vaginal bleeding that is not part of your normal menstrual period. · You have pain in your belly or pelvis. · You have been sexually assaulted. A pelvic exam lets your doctor collect evidence and check for STIs. · You are pregnant. · You are having trouble getting pregnant. What are the risks of a pelvic exam?  There are no risks from a pelvic exam.  When should you call for help? Watch closely for changes in your health, and be sure to contact your doctor if you have any problems. Where can you learn more?   Go to http://jason-francisco javier.info/. Enter J588 in the search box to learn more about \"Pelvic Exam: Care Instructions. \"  Current as of: October 13, 2016  Content Version: 11.4  © 1666-6603 Healthwise, [employer]. Care instructions adapted under license by [employer] (which disclaims liability or warranty for this information). If you have questions about a medical condition or this instruction, always ask your healthcare professional. [place] [address] any warranty or liability for your use of this information.

## 2018-06-06 ENCOUNTER — OFFICE VISIT (OUTPATIENT)
Dept: BEHAVIORAL/MENTAL HEALTH CLINIC | Age: 48
End: 2018-06-06

## 2018-06-06 DIAGNOSIS — F32.5 MAJOR DEPRESSION IN COMPLETE REMISSION (HCC): ICD-10-CM

## 2018-06-06 DIAGNOSIS — F33.42 MAJOR DEPRESSION, RECURRENT, FULL REMISSION (HCC): Primary | ICD-10-CM

## 2018-06-06 DIAGNOSIS — F41.1 GENERALIZED ANXIETY DISORDER: ICD-10-CM

## 2018-06-06 RX ORDER — LEVOTHYROXINE SODIUM 137 UG/1
150 TABLET ORAL
Qty: 1 TAB | Refills: 0
Start: 2018-06-06 | End: 2018-08-28 | Stop reason: SDUPTHER

## 2018-06-06 RX ORDER — BUPROPION HYDROCHLORIDE 300 MG/1
300 TABLET ORAL
Qty: 90 TAB | Refills: 1 | Status: SHIPPED | OUTPATIENT
Start: 2018-06-06 | End: 2018-12-12 | Stop reason: SDUPTHER

## 2018-06-06 RX ORDER — ESCITALOPRAM OXALATE 20 MG/1
20 TABLET ORAL DAILY
Qty: 90 TAB | Refills: 1 | Status: SHIPPED | OUTPATIENT
Start: 2018-06-06 | End: 2018-12-12 | Stop reason: SDUPTHER

## 2018-08-28 ENCOUNTER — HOSPITAL ENCOUNTER (OUTPATIENT)
Dept: MAMMOGRAPHY | Age: 48
Discharge: HOME | End: 2018-08-28
Attending: NURSE PRACTITIONER
Payer: COMMERCIAL

## 2018-08-28 ENCOUNTER — OFFICE VISIT (OUTPATIENT)
Dept: INTERNAL MEDICINE CLINIC | Age: 48
End: 2018-08-28

## 2018-08-28 VITALS
WEIGHT: 161.4 LBS | RESPIRATION RATE: 16 BRPM | SYSTOLIC BLOOD PRESSURE: 104 MMHG | BODY MASS INDEX: 28.6 KG/M2 | HEIGHT: 63 IN | HEART RATE: 79 BPM | TEMPERATURE: 98.8 F | DIASTOLIC BLOOD PRESSURE: 72 MMHG | OXYGEN SATURATION: 98 %

## 2018-08-28 DIAGNOSIS — F33.9 RECURRENT MAJOR DEPRESSIVE DISORDER, REMISSION STATUS UNSPECIFIED (HCC): ICD-10-CM

## 2018-08-28 DIAGNOSIS — Z00.00 ANNUAL PHYSICAL EXAM: Primary | ICD-10-CM

## 2018-08-28 DIAGNOSIS — Z13.21 ENCOUNTER FOR VITAMIN DEFICIENCY SCREENING: ICD-10-CM

## 2018-08-28 DIAGNOSIS — Z12.31 VISIT FOR SCREENING MAMMOGRAM: ICD-10-CM

## 2018-08-28 DIAGNOSIS — E03.9 ACQUIRED HYPOTHYROIDISM: ICD-10-CM

## 2018-08-28 PROCEDURE — 77067 SCR MAMMO BI INCL CAD: CPT

## 2018-08-28 RX ORDER — LEVOTHYROXINE SODIUM 150 UG/1
150 TABLET ORAL
Qty: 90 TAB | Refills: 3
Start: 2018-08-28 | End: 2020-03-20 | Stop reason: SDUPTHER

## 2018-08-28 NOTE — PROGRESS NOTES
Handy Patel is a 50 y.o. female    Chief Complaint   Patient presents with    Physical    Thyroid Problem     thyroid level check. 1. Have you been to the ER, urgent care clinic since your last visit? Hospitalized since your last visit? No    2. Have you seen or consulted any other health care providers outside of the [address] [place] [address] since your last visit? Include any pap smears or colon screening.  No     Visit Vitals    /72 (BP 1 Location: Left arm, BP Patient Position: Sitting)    Pulse 79    Temp 98.8 °F (37.1 °C)    Resp 16    Ht 5' 3\" (1.6 m)    Wt 161 lb 6.4 oz (73.2 kg)    SpO2 98%    BMI 28.59 kg/m2     Brandy Gann LPN

## 2018-08-28 NOTE — PATIENT INSTRUCTIONS
Intermittent fasting    Have fasting lab work done (okay to have water, black coffee, or tea x 8 hours prior)

## 2018-08-28 NOTE — MR AVS SNAPSHOT
[address], [address] [facility] 
[phone] Patient: Sona Dill MRN: [id number] [facility]:0/7/2684 Visit Information Date & Time Provider Department Dept. Phone Encounter #  
 8/28/2018  2:40 PM [facility] [id number] Follow-up Instructions Return in about 1 year (around 8/28/2019) for CPE. Your Appointments 12/12/2018  4:00 PM  
ESTABLISHED PATIENT with Joe Stroud MD  
[facility] [address] [place] [address]) Appt Note: 6 mo fu  
 [address] [facility] [address] [place] [address]  
[phone] [address] [place] [address] Upcoming Health Maintenance Date Due Influenza Age 5 to Adult 8/1/2018 PAP AKA CERVICAL CYTOLOGY 8/1/2020 DTaP/Tdap/Td series (2 - Td) 7/10/2025 Allergies as of 8/28/2018  Review Complete On: 8/28/2018 By: Pricilla Dillon LPN Severity Noted Reaction Type Reactions Pcn [Penicillins]  02/28/2011    Rash Current Immunizations  Reviewed on 7/10/2015 Name Date Influenza Vaccine 10/4/2017, 10/16/2015, 12/2/2014, 10/1/2013 Influenza Vaccine Whole 10/1/2010 TD Vaccine 2/28/2008 Tdap 7/10/2015 Not reviewed this visit You Were Diagnosed With   
  
 Codes Comments Annual physical exam    -  Primary ICD-10-CM: Z00.00 ICD-9-CM: V70.0 Acquired hypothyroidism     ICD-10-CM: E03.9 ICD-9-CM: 244.9 Encounter for vitamin deficiency screening     ICD-10-CM: Z13.21 ICD-9-CM: V77.99 Vitals BP Pulse Temp Resp Height(growth percentile) Weight(growth percentile) 104/72 (BP 1 Location: Left arm, BP Patient Position: Sitting) 79 98.8 °F (37.1 °C) 16 5' 3\" (1.6 m) 161 lb 6.4 oz (73.2 kg) LMP SpO2 BMI OB Status Smoking Status 08/15/2018 (Approximate) 98% 28.59 kg/m2 Having regular periods Never Smoker Vitals History BMI and BSA Data Body Mass Index Body Surface Area 28.59 kg/m 2 1.8 m 2 Preferred Pharmacy Pharmacy Name Phone  [address] [phone] Your Updated Medication List  
  
   
This list is accurate as of 8/28/18  3:36 PM.  Always use your most recent med list.  
  
  
  
  
 Biotin 2,500 mcg Cap Take  by mouth. buPROPion  mg XL tablet Commonly known as:  Syble Ko Take 1 Tab by mouth every morning. cholecalciferol 1,000 unit tablet Commonly known as:  VITAMIN D3 Take  by mouth daily. cyanocobalamin 1,000 mcg sublingual tablet Commonly known as:  VITAMIN B-12 Take 1,000 mcg by mouth daily. escitalopram oxalate 20 mg tablet Commonly known as:  Gillian Moots Take 1 Tab by mouth daily. levothyroxine 137 mcg tablet Commonly known as:  SYNTHROID Take 150 mcg by mouth Daily (before breakfast). multivitamin tablet Commonly known as:  ONE A DAY Take 1 Tab by mouth daily. MYZILRA 50-30 (6)/75-40 (5)/125-30(10) Tab Generic drug:  levonorgestrel-ethinyl estradiol TAKE 1 TABLET DAILY We Performed the Following CBC WITH AUTOMATED DIFF [66284 CPT(R)] HEMOGLOBIN A1C WITH EAG [26935 CPT(R)] LIPID PANEL [55937 CPT(R)] METABOLIC PANEL, COMPREHENSIVE [19185 CPT(R)] TSH RFX ON ABNORMAL TO FREE T4 [[id number] Custom] VITAMIN D, 25 HYDROXY [id number] CPT(R)] Follow-up Instructions Return in about 1 year (around 8/28/2019) for CPE. To-Do List   
 08/28/2018 4:00 PM  
  Appointment with [facility] 1 at [address] [place] [address] ([phone]) Shower or bathe using soap and water. Do not use deodorant, powder, perfumes, or lotion the day of your exam.  If your prior mammograms were not performed at [facility] please bring films with you or forward prior images 2 days before your procedure.   Check in at registration 15min before your appointment time unless you were instructed to do otherwise. A script is not necessary, but if you have one, please bring it on the day of the mammogram or have it faxed to the department. You are responsible for finding a method of transportation to your appointment. If you don't have transportation, please reschedule your appointment at least 24 hours in advance. [facility] [phone] [facility]  [phone] [facility] [address] [place]  [phone] [facility] [phone] [facility] 6613 [facility] [phone] Patient Instructions Intermittent fasting Have fasting lab work done (okay to have water, black coffee, or tea x 8 hours prior) Introducing [facility]! Dear Memory Leaver: Thank you for requesting a MediaLink account. Our records indicate that you already have an active MediaLink account. You can access your account anytime at [email or web address] Did you know that you can access your hospital and ER discharge instructions at any time in MediaLink? You can also review all of your test results from your hospital stay or ER visit. Additional Information If you have questions, please visit the Frequently Asked Questions section of the MediaLink website at [email or web address] Remember, MediaLink is NOT to be used for urgent needs. For medical emergencies, dial 911. Now available from your iPhone and Android! Please provide this summary of care documentation to your next provider. Your primary care clinician is listed as Monika Arizmendi. If you have any questions after today's visit, please call [phone].

## 2018-08-28 NOTE — PROGRESS NOTES
Subjective:      Keyana Longoria is a 50 y.o. female who presents today for CPE    12/17 started working again for [employer], enjoys her job and co-workers  Works 8:32am-9pm    Just got back from [place] for 4 days. Had a condo at [address] to stay    Is exercising, doing hot yoga 2-3 times a week. Not as much as she used to  Gained some weight, is stress eating- lots of sweets and stress at work    Mammogram scheduled for later this day      Hypothroidism:  Follows with Endocrinologist Dr. Malini Cheng in 02/2018. Levothyroxine was changed to 150 mcg daily     Depression:  Takes Welbutrin and Lexapro, follows with Psychiatrist Dr. Kevan Kocher- sees him every 6 months  Sleeping well  Previously went the therapy, no longer. Doing well    Patient Active Problem List    Diagnosis Date Noted    Major depression, recurrent, full remission ([facility] 09/29/2015    Generalized anxiety disorder 09/29/2015    Pap smear for cervical cancer screening 01/14/2014    Headache(784.0) 04/25/2012    Hypothyroidism 02/28/2011        Review of Systems    Pertinent items are noted in HPI. Objective:     Visit Vitals    /72 (BP 1 Location: Left arm, BP Patient Position: Sitting)    Pulse 79    Temp 98.8 °F (37.1 °C)    Resp 16    Ht 5' 3\" (1.6 m)    Wt 161 lb 6.4 oz (73.2 kg)    LMP 08/15/2018 (Approximate)    SpO2 98%    BMI 28.59 kg/m2     General:  Alert, cooperative, no distress, appears stated age. Head:  Normocephalic, without obvious abnormality, atraumatic. Eyes:  Conjunctivae/corneas clear. PERRL, EOMs intact. Ears:  Normal TMs and external ear canals both ears. Nose: Nares normal. Septum midline. Mucosa normal   Throat: Lips, mucosa, and tongue normal. Teeth and gums normal.   Neck: Supple, symmetrical, trachea midline, no adenopathy, thyroid: no enlargement/tenderness/nodules, no carotid bruit and no JVD. Back:   Symmetric, no curvature. ROM normal. No CVA tenderness.    Lungs:   Clear to auscultation bilaterally, regular effort, no cough   Chest wall:  No tenderness or deformity. Heart:  Regular rate and rhythm, S1, S2 normal, no murmur, click, rub or gallop. Abdomen:   Soft, non-tender. Bowel sounds normal. No masses,  No organomegaly. Extremities: Extremities normal, atraumatic, no cyanosis or edema. Pulses: 2+ and symmetric all extremities. Skin: Skin color, texture, turgor normal. No rashes or lesions. Lymph nodes: Cervical, supraclavicular nodes normal.   Neurologic: CNII-XII intact. Normal strength, sensation throughout. Assessment/Plan:     1. Annual physical exam  -  UTD  - LIPID PANEL  - METABOLIC PANEL, COMPREHENSIVE  - CBC WITH AUTOMATED DIFF  - VITAMIN D, 25 HYDROXY  - HEMOGLOBIN A1C WITH EAG    2. Acquired hypothyroidism  - cont following with Dr. Enrique Carter  - cont levothyroxine 150mcg daily  - TSH RFX ON ABNORMAL TO FREE T4    3. Recurrent major depressive disorder, remission status unspecified ([facility]  - cont following with Dr. Radha Carrion  - cont current meds    4. Encounter for vitamin deficiency screening  - VITAMIN D, 25 HYDROXY      Advised her to call back or return to office if symptoms worsen/change/persist.  Discussed expected course/resolution/complications of diagnosis in detail with patient. Medication risks/benefits/costs/interactions/alternatives discussed with patient. She was given an after visit summary which includes diagnoses, current medications, & vitals. She expressed understanding with the diagnosis and plan.     MERLYN Santacruz

## 2018-09-08 LAB
25(OH)D3+25(OH)D2 SERPL-MCNC: 42.2 NG/ML (ref 30–100)
ALBUMIN SERPL-MCNC: 4.1 G/DL (ref 3.5–5.5)
ALBUMIN/GLOB SERPL: 1.5 {RATIO} (ref 1.2–2.2)
ALP SERPL-CCNC: 51 IU/L (ref 39–117)
ALT SERPL-CCNC: 15 IU/L (ref 0–32)
AST SERPL-CCNC: 16 IU/L (ref 0–40)
BASOPHILS # BLD AUTO: 0 X10E3/UL (ref 0–0.2)
BASOPHILS NFR BLD AUTO: 1 %
BILIRUB SERPL-MCNC: 0.2 MG/DL (ref 0–1.2)
BUN SERPL-MCNC: 14 MG/DL (ref 6–24)
BUN/CREAT SERPL: 14 (ref 9–23)
CALCIUM SERPL-MCNC: 9.3 MG/DL (ref 8.7–10.2)
CHLORIDE SERPL-SCNC: 102 MMOL/L (ref 96–106)
CHOLEST SERPL-MCNC: 222 MG/DL (ref 100–199)
CO2 SERPL-SCNC: 23 MMOL/L (ref 20–29)
CREAT SERPL-MCNC: 0.97 MG/DL (ref 0.57–1)
EOSINOPHIL # BLD AUTO: 0.1 X10E3/UL (ref 0–0.4)
EOSINOPHIL NFR BLD AUTO: 1 %
ERYTHROCYTE [DISTWIDTH] IN BLOOD BY AUTOMATED COUNT: 13.5 % (ref 12.3–15.4)
EST. AVERAGE GLUCOSE BLD GHB EST-MCNC: 103 MG/DL
GLOBULIN SER CALC-MCNC: 2.8 G/DL (ref 1.5–4.5)
GLUCOSE SERPL-MCNC: 95 MG/DL (ref 65–99)
HBA1C MFR BLD: 5.2 % (ref 4.8–5.6)
HCT VFR BLD AUTO: 42.2 % (ref 34–46.6)
HDLC SERPL-MCNC: 99 MG/DL
HGB BLD-MCNC: 13.6 G/DL (ref 11.1–15.9)
IMM GRANULOCYTES # BLD: 0 X10E3/UL (ref 0–0.1)
IMM GRANULOCYTES NFR BLD: 0 %
INTERPRETATION, 910389: NORMAL
LDLC SERPL CALC-MCNC: 110 MG/DL (ref 0–99)
LYMPHOCYTES # BLD AUTO: 2 X10E3/UL (ref 0.7–3.1)
LYMPHOCYTES NFR BLD AUTO: 38 %
MCH RBC QN AUTO: 30.9 PG (ref 26.6–33)
MCHC RBC AUTO-ENTMCNC: 32.2 G/DL (ref 31.5–35.7)
MCV RBC AUTO: 96 FL (ref 79–97)
MONOCYTES # BLD AUTO: 0.2 X10E3/UL (ref 0.1–0.9)
MONOCYTES NFR BLD AUTO: 4 %
NEUTROPHILS # BLD AUTO: 2.9 X10E3/UL (ref 1.4–7)
NEUTROPHILS NFR BLD AUTO: 56 %
PLATELET # BLD AUTO: 253 X10E3/UL (ref 150–379)
POTASSIUM SERPL-SCNC: 4.7 MMOL/L (ref 3.5–5.2)
PROT SERPL-MCNC: 6.9 G/DL (ref 6–8.5)
RBC # BLD AUTO: 4.4 X10E6/UL (ref 3.77–5.28)
SODIUM SERPL-SCNC: 141 MMOL/L (ref 134–144)
TRIGL SERPL-MCNC: 65 MG/DL (ref 0–149)
TSH SERPL DL<=0.005 MIU/L-ACNC: 0.57 UIU/ML (ref 0.45–4.5)
VLDLC SERPL CALC-MCNC: 13 MG/DL (ref 5–40)
WBC # BLD AUTO: 5.2 X10E3/UL (ref 3.4–10.8)

## 2018-09-10 NOTE — PROGRESS NOTES
09/10/18  Results sent to patient via [id number] [facility] Hansel, NP

## 2018-09-14 RX ORDER — LEVONORGESTREL AND ETHINYL ESTRADIOL 6-5-10
KIT ORAL
Qty: 90 TAB | Refills: 3 | Status: SHIPPED | OUTPATIENT
Start: 2018-09-14 | End: 2018-09-28 | Stop reason: SDUPTHER

## 2018-09-26 ENCOUNTER — PATIENT MESSAGE (OUTPATIENT)
Dept: INTERNAL MEDICINE CLINIC | Age: 48
End: 2018-09-26

## 2018-09-28 RX ORDER — LEVONORGESTREL AND ETHINYL ESTRADIOL 6-5-10
KIT ORAL
Qty: 90 TAB | Refills: 3 | Status: SHIPPED | OUTPATIENT
Start: 2018-09-28 | End: 2018-12-19 | Stop reason: ALTCHOICE

## 2018-09-28 NOTE — TELEPHONE ENCOUNTER
From: Maryjo Lauren  To: Neeraj Stein NP  Sent: 9/26/2018 4:27 PM EDT  Subject: Prescription Question    Hi! On 9/14/18 I received an email that you had approved a refill request (Lorraine Youssef) from [facility] mail order. Unfortunately they do not have a record of your approval. Could you please send in a new prescription for my birth control pills? Thanks!   Lynsey Blanton

## 2018-12-10 ENCOUNTER — PATIENT MESSAGE (OUTPATIENT)
Dept: INTERNAL MEDICINE CLINIC | Age: 48
End: 2018-12-10

## 2018-12-11 ENCOUNTER — OFFICE VISIT (OUTPATIENT)
Dept: INTERNAL MEDICINE CLINIC | Age: 48
End: 2018-12-11

## 2018-12-11 VITALS
HEIGHT: 63 IN | TEMPERATURE: 99.6 F | OXYGEN SATURATION: 98 % | RESPIRATION RATE: 17 BRPM | BODY MASS INDEX: 28.35 KG/M2 | WEIGHT: 160 LBS | SYSTOLIC BLOOD PRESSURE: 118 MMHG | HEART RATE: 83 BPM | DIASTOLIC BLOOD PRESSURE: 82 MMHG
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
## 2018-12-11 DIAGNOSIS — R51.9 SCALP PAIN: ICD-10-CM

## 2018-12-11 DIAGNOSIS — M54.9 UPPER BACK PAIN ON RIGHT SIDE: Primary | ICD-10-CM

## 2018-12-11 RX ORDER — ZOLPIDEM TARTRATE 10 MG/1
TABLET ORAL
COMMUNITY
End: 2019-06-12

## 2018-12-11 RX ORDER — DICLOFENAC SODIUM 75 MG/1
75 TABLET, DELAYED RELEASE ORAL 2 TIMES DAILY
Qty: 20 TAB | Refills: 0 | Status: SHIPPED | OUTPATIENT
Start: 2018-12-11 | End: 2018-12-12 | Stop reason: SDUPTHER
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
## 2018-12-11 NOTE — TELEPHONE ENCOUNTER
From: Edna Vásquez  To: Barbara Schofield NP  Sent: 12/10/2018 8:55 PM EST  Subject: Prescription Question    Hi. Would it be possible to get a refill of Diclofenac (voltaren)? Haven't had to have a refill since last year, but I'm having some shoulder pain again. I'm happy to come into the office if needed, but wasn't sure if it would be necessary.   Thank you,  Cinda Ross  [phone]

## 2018-12-11 NOTE — PATIENT INSTRUCTIONS
Moist heat to upper right back for 20 minutes per application  ______________________________________________     Healthy Upper Back: Exercises  Your Care Instructions  Here are some examples of exercises for your upper back. Start each exercise slowly. Ease off the exercise if you start to have pain. Your doctor or physical therapist will tell you when you can start these exercises and which ones will work best for you. How to do the exercises  Lower neck and upper back stretch    1. Stretch your arms out in front of your body. Clasp one hand on top of your other hand. 2. Gently reach out so that you feel your shoulder blades stretching away from each other. 3. Gently bend your head forward. 4. Hold for 15 to 30 seconds. 5. Repeat 2 to 4 times. Midback stretch    1. Kneel on the floor, and sit back on your ankles. 2. Lean forward, place your hands on the floor, and stretch your arms out in front of you. Rest your head between your arms. 3. Gently push your chest toward the floor, reaching as far in front of you as possible. 4. Hold for 15 to 30 seconds. 5. Repeat 2 to 4 times. Shoulder rolls    1. Sit comfortably with your feet shoulder-width apart. You can also do this exercise while standing. 2. Roll your shoulders up, then back, and then down in a smooth, circular motion. 3. Repeat 2 to 4 times. Wall push-up    1. Stand against a wall with your feet about 12 to 24 inches back from the wall. If you feel any pain when you do this exercise, stand closer to the wall. 2. Place your hands on the wall slightly wider apart than your shoulders, and lean forward. 3. Gently lean your body toward the wall. Then push back to your starting position. Keep the motion smooth and controlled. 4. Repeat 8 to 12 times. Resisted shoulder blade squeeze    1. Sit or stand, holding the band in both hands in front of you. Keep your elbows close to your sides, bent at a 90-degree angle.  Your palms should face up.  2. Squeeze your shoulder blades together, and move your arms to the outside, stretching the band. Be sure to keep your elbows at your sides while you do this. 3. Relax. 4. Repeat 8 to 12 times. Resisted rows    1. Put the band around a solid object, such as a bedpost, at about waist level. Hold one end of the band in each hand. 2. With your elbows at your sides and bent to 90 degrees, pull the band back to move your shoulder blades toward each other. Return to the starting position. 3. Repeat 8 to 12 times. Follow-up care is a key part of your treatment and safety. Be sure to make and go to all appointments, and call your doctor if you are having problems. It's also a good idea to know your test results and keep a list of the medicines you take. Where can you learn more? Go to http://jason-francisco javier.info/. Enter S705 in the search box to learn more about \"Healthy Upper Back: Exercises. \"  Current as of: November 29, 2017  Content Version: 11.8  © 0547-5414 [employer]wise, [employer]. Care instructions adapted under license by [employer] (which disclaims liability or warranty for this information). If you have questions about a medical condition or this instruction, always ask your healthcare professional. [address] any warranty or liability for your use of this information.

## 2018-12-12 ENCOUNTER — OFFICE VISIT (OUTPATIENT)
Dept: BEHAVIORAL/MENTAL HEALTH CLINIC | Age: 48
End: 2018-12-12

## 2018-12-12 VITALS
HEART RATE: 90 BPM | BODY MASS INDEX: 28.88 KG/M2 | DIASTOLIC BLOOD PRESSURE: 81 MMHG | WEIGHT: 163 LBS | HEIGHT: 63 IN | SYSTOLIC BLOOD PRESSURE: 128 MMHG

## 2018-12-12 DIAGNOSIS — F41.1 GENERALIZED ANXIETY DISORDER: ICD-10-CM

## 2018-12-12 DIAGNOSIS — F32.5 MAJOR DEPRESSION IN COMPLETE REMISSION (HCC): ICD-10-CM

## 2018-12-12 DIAGNOSIS — F33.42 MAJOR DEPRESSION, RECURRENT, FULL REMISSION (HCC): Primary | ICD-10-CM

## 2018-12-12 RX ORDER — ESCITALOPRAM OXALATE 20 MG/1
20 TABLET ORAL DAILY
Qty: 90 TAB | Refills: 1 | Status: SHIPPED | OUTPATIENT
Start: 2018-12-12 | End: 2019-06-12 | Stop reason: SDUPTHER

## 2018-12-12 RX ORDER — DICLOFENAC SODIUM 75 MG/1
75 TABLET, DELAYED RELEASE ORAL
Qty: 30 TAB | Refills: 0 | Status: SHIPPED | OUTPATIENT
Start: 2018-12-12 | End: 2019-06-12 | Stop reason: ALTCHOICE

## 2018-12-12 RX ORDER — BUPROPION HYDROCHLORIDE 300 MG/1
300 TABLET ORAL
Qty: 90 TAB | Refills: 1 | Status: SHIPPED | OUTPATIENT
Start: 2018-12-12 | End: 2019-06-12 | Stop reason: SDUPTHER

## 2018-12-19 ENCOUNTER — OFFICE VISIT (OUTPATIENT)
Dept: OBGYN CLINIC | Age: 48
End: 2018-12-19

## 2018-12-19 VITALS
BODY MASS INDEX: 28.84 KG/M2 | SYSTOLIC BLOOD PRESSURE: 112 MMHG | WEIGHT: 162.8 LBS | HEIGHT: 63 IN | DIASTOLIC BLOOD PRESSURE: 74 MMHG

## 2018-12-19 DIAGNOSIS — Z11.51 SPECIAL SCREENING EXAMINATION FOR HUMAN PAPILLOMAVIRUS (HPV): Primary | ICD-10-CM

## 2018-12-19 DIAGNOSIS — Z11.3 SCREENING FOR VENEREAL DISEASE: ICD-10-CM

## 2018-12-19 DIAGNOSIS — R87.810 PAPANICOLAOU SMEAR OF CERVIX WITH POSITIVE HIGH RISK HUMAN PAPILLOMA VIRUS (HPV) TEST: ICD-10-CM

## 2018-12-19 DIAGNOSIS — Z01.419 WELL WOMAN EXAM: ICD-10-CM

## 2018-12-19 NOTE — PATIENT INSTRUCTIONS

## 2018-12-19 NOTE — PROGRESS NOTES
Annual Visit    Adal Leone is a 50 y.o. G0 presenting for annual visit. Doing well. Still with regular monthly menses, on Enpress OCPs, has been using this pill for many years, open to trial of lower estrogen dosing with OCPs. New relationship, desiring full STD testing today. Pt also needing repeat cotesting today, d/t prior pap NILM HPV+ 8/2017 (HR types 16 and 18 negative, result in [facility]). Pt reports remote h/o high-grade dysplasia requiring LEEP in 2004, but subsequent paps have all been normal, she had returned to routine screening interval. Last pap 3 years ago was wnl. Pt sexually active with 1 partner in the past 5 years. PMH of thyroid dz and depression, otherwise healthy. Ob/Gyn Hx:  G 0  LMP: 11/28/18  Menses- regular monthly cycles x3-4 days, moderate flow, +cramping  Contraception-OCP  STI- HPV+  ? SA-yes, 1 partner for past 3 years    Health maintenance:  Pap-8/1/17, NILM HPV+, h/o LEEP in 2004 for high-grade dysplasia  Mammo-8/28/18 B1  Colonoscopy- Denies  Gardasil-Denies, outside of recommended age    Past Medical History:   Diagnosis Date    Abnormal Papanicolaou smear of cervix     Depression with anxiety 1995    h/o psychiatric hosp 2013 ([place])    Family history of skin cancer     History of migraine     preivoulsy required Botox    Sun-damaged skin     childhood and young adult    Sunburn, blistering     childhood    Tanning bed exposure     Unspecified hypothyroidism        Past Surgical History:   Procedure Laterality Date    HX BREAST AUGMENTATION  2006    bilateral    HX LEEP PROCEDURE  2004    HX UROLOGICAL      urethral dilation 11years of age   [facility] IMPLANT BREAST SILICONE/EQ Bilateral     2006 [place]    DUSTIN MAMMOGRAM DIGITAL BILATERAL  5/13       Family History   Problem Relation Age of Onset    Hypertension Father     Cancer Father         AML    Arthritis-osteo Mother         TKR    Hypertension Mother     Parkinson's Disease Mother     Bleeding Prob Maternal Aunt 70    Breast Cancer Maternal Aunt         46s    Cancer Maternal Grandmother         stomach    Breast Cancer Cousin 39       Social History     Socioeconomic History    Marital status: LEGALLY      Spouse name: .  Number of children: 0    Years of education: college    Highest education level: Not on file   Social Needs    Financial resource strain: Not on file    Food insecurity - worry: Not on file    Food insecurity - inability: Not on file    Transportation needs - medical: Not on file   Habet needs - non-medical: Not on file   Occupational History    Occupation: [employer]      Employer: [employer]   Tobacco Use    Smoking status: Never Smoker    Smokeless tobacco: Never Used   Substance and Sexual Activity    Alcohol use: Yes     Alcohol/week: 0.6 oz     Types: 1 Standard drinks or equivalent per week     Comment: 1 drink most weeks    Drug use: No    Sexual activity: Yes     Partners: Male     Birth control/protection: Pill   Other Topics Concern     Service Not Asked    Blood Transfusions Not Asked    Caffeine Concern Not Asked    Occupational Exposure Not Asked    Hobby Hazards Not Asked    Sleep Concern Not Asked    Stress Concern Not Asked    Weight Concern Not Asked    Special Diet Not Asked    Back Care Not Asked    Exercise No     Comment: not exercising     Bike Helmet Not Asked    Seat Belt Not Asked    Self-Exams Not Asked   Social History Narrative    Not on file       Current Outpatient Medications   Medication Sig Dispense Refill    diclofenac EC (VOLTAREN) 75 mg EC tablet Take 1 Tab by mouth two (2) times daily as needed. 30 Tab 0    buPROPion XL (WELLBUTRIN XL) 300 mg XL tablet Take 1 Tab by mouth every morning. 90 Tab 1    escitalopram oxalate (LEXAPRO) 20 mg tablet Take 1 Tab by mouth daily. 90 Tab 1    zolpidem (AMBIEN) 10 mg tablet Take  by mouth nightly as needed for Sleep.       levonorgestrel-ethinyl estradiol (MYZILRA) 50-30 (6)/75-40 (5)/125-30(10) tab TAKE 1 TABLET DAILY 90 Tab 3    levothyroxine (SYNTHROID) 150 mcg tablet Take 1 Tab by mouth Daily (before breakfast). 90 Tab 3    cholecalciferol (VITAMIN D3) 1,000 unit tablet Take  by mouth daily.  cyanocobalamin (VITAMIN B-12) 1,000 mcg sublingual tablet Take 1,000 mcg by mouth daily.  Biotin 2,500 mcg cap Take  by mouth.  multivitamin (ONE A DAY) tablet Take 1 Tab by mouth daily.          Allergies   Allergen Reactions    Pcn [Penicillins] Rash       Review of Systems - History obtained from the patient  Constitutional: negative for weight loss, fever, night sweats  HEENT: negative for hearing loss, earache, congestion, snoring, sorethroat  CV: negative for chest pain, palpitations, edema  Resp: negative for cough, shortness of breath, wheezing  GI: negative for change in bowel habits, abdominal pain, black or bloody stools  : negative for frequency, dysuria, hematuria, vaginal discharge  MSK: negative for back pain, joint pain, muscle pain  Breast: negative for breast lumps, nipple discharge, galactorrhea  Skin :negative for itching, rash, hives  Neuro: negative for dizziness, headache, confusion, weakness  Psych: negative for anxiety, depression, change in mood  Heme/lymph: negative for bleeding, bruising, pallor    Physical Exam    Visit Vitals  /74 (BP 1 Location: Left arm, BP Patient Position: Sitting)   Ht 5' 3\" (1.6 m)   Wt 162 lb 12.8 oz (73.8 kg)   BMI 28.84 kg/m²     Constitutional  · Appearance: well-nourished, well developed, alert, in no acute distress    HENT  · Head and Face: appears normal    Chest  · Respiratory Effort: non-labored breathing, CTAB    Cardiovascular   Heart: Reg rate and rhythm  · Extremities: no peripheral edema    Breast exam: bilateral breast augmentation, otherwise unremarkable exam, normal appearance, symmetric, no lumps or masses, no skin changes, no nipple inversion or d/c, etc    Gastrointestinal  · Abdominal Examination: abdomen non-tender to palpation, normal bowel sounds, no masses present  · Liver and spleen: no hepatomegaly present, spleen not palpable  · Hernias: no hernias identified    Genitourinary  · External Genitalia: normal appearance for age, no discharge present, no tenderness present, no inflammatory lesions present, no masses present, no atrophy present  · Vagina: normal vaginal vault without central or paravaginal defects, no discharge present, no inflammatory lesions present, no masses present  · Bladder: non-tender to palpation  · Urethra: appears normal  · Cervix: normal, no cervicitis, no lesions, no CMT, +bleeding after pap   · Uterus: normal size, shape and consistency  · Adnexa: no adnexal tenderness present, no adnexal masses present  · Perineum: perineum within normal limits, no evidence of trauma, no rashes or skin lesions present    Skin  · General Inspection: no rash, no lesions identified    Neurologic/Psychiatric  · Mental Status:  · Orientation: grossly oriented to person, place and time  · Mood and Affect: mood normal, affect appropriate      Assessment/Plan:  50 y.o. G0 presenting for annual exam and follow up of NILM HPV+ pap 8/2017 (HR types 16 and 18 negative).      Annual exam:  -diet/exercies/lifestyle counseling  -pap/HPV today  -full STI screening  -mammo utd  -colonoscopy not yet indicated  -trial of LoLoestrin OCPs    RTC: 1 year for annual well-woman visit and repeat pap/hpv, or sooner jesus Nieto MD  12/19/2018  11:46 AM

## 2018-12-20 LAB
HBV SURFACE AG SERPL QL IA: NEGATIVE
HCV AB S/CO SERPL IA: <0.1 S/CO RATIO (ref 0–0.9)
HIV 1+2 AB+HIV1 P24 AG SERPL QL IA: NON REACTIVE
RPR SER QL: NON REACTIVE

## 2018-12-21 LAB
C TRACH RRNA SPEC QL NAA+PROBE: NEGATIVE
N GONORRHOEA RRNA SPEC QL NAA+PROBE: NEGATIVE
T VAGINALIS RRNA VAG QL NAA+PROBE: NEGATIVE

## 2018-12-22 LAB
CYTOLOGIST CVX/VAG CYTO: NORMAL
CYTOLOGY CVX/VAG DOC THIN PREP: NORMAL
DX ICD CODE: NORMAL
HPV I/H RISK 1 DNA CVX QL PROBE+SIG AMP: NEGATIVE
Lab: NORMAL
OTHER STN SPEC: NORMAL
PATH REPORT.FINAL DX SPEC: NORMAL
STAT OF ADQ CVX/VAG CYTO-IMP: NORMAL

## 2018-12-29 NOTE — PROGRESS NOTES
Psychiatric Outpatient Progress Note    Date: 12/29/2018  Account Number:  [id number]  Name: Violette Haas    Length of psychotherapy session: 20 minutes       SUBJECTIVE:   Violette Haas  is a 50 y.o.  female  patient presents for a therapy/psychopharmacological management appointment. Ms. Rico Drake presents to the appointment reporting stable mood. Continued distress related to her mother's severe depression, parkinson's disease and obstinance. She is sleeping well at night,  good appetite. She started a new job in December and although less money, there is the positive of less stress and improved sleep. She denies any feelings of depression or SI. Primary stressor- mother and work. PFSHx: no changes  ROS:   Appetite:good , Sleep: much improved; A review of neurological, orthopedic, HEENT, Constitutional systems were within normal limits.     Weight gain- 160.6lbs    Response to Treatment: improving  Side Effects: denied    Supportive/Cognitive/Reality-Oriented psychotherapy provided in regards to psychosocial stressors:   Current problems   Housing issues   Occupational issues- unemployed; looking for work   Academic issues   Legal issues   Medical issues   Interpersonal conflicts  Psychoeducation provided  Treatment plan reviewed with patient-including diagnosis and medications  Worked on issues of denial & effects of substance dependency/use      OBJECTIVE:                 Mental Status exam: WNL except for      Sensorium  oriented to time, place and person   Relations cooperative    Eye Contact    appropriate   Appearance:  age appropriate and casually dressed   Motor Behavior:  gait stable   Speech:  normal pitch and normal volume   Thought Process: goal directed and within normal limits   Thought Content free of delusions and free of hallucinations   Suicidal ideations none   Homicidal ideations none   Mood:   stable, improved   Affect:   stable, improved   Memory recent  adequate   Memory remote: adequate   Concentration:  adequate   Abstraction:  abstract   Insight:  good   Reliability good   Judgment:  good       Allergies   Allergen Reactions    Pcn [Penicillins] Rash        Current Outpatient Medications   Medication Sig Dispense Refill    diclofenac EC (VOLTAREN) 75 mg EC tablet Take 1 Tab by mouth two (2) times daily as needed. 30 Tab 0    buPROPion XL (WELLBUTRIN XL) 300 mg XL tablet Take 1 Tab by mouth every morning. 90 Tab 1    escitalopram oxalate (LEXAPRO) 20 mg tablet Take 1 Tab by mouth daily. 90 Tab 1    zolpidem (AMBIEN) 10 mg tablet Take  by mouth nightly as needed for Sleep.  levothyroxine (SYNTHROID) 150 mcg tablet Take 1 Tab by mouth Daily (before breakfast). 90 Tab 3    cholecalciferol (VITAMIN D3) 1,000 unit tablet Take  by mouth daily.  cyanocobalamin (VITAMIN B-12) 1,000 mcg sublingual tablet Take 1,000 mcg by mouth daily.  Biotin 2,500 mcg cap Take  by mouth.  multivitamin (ONE A DAY) tablet Take 1 Tab by mouth daily.  norethindrone-e.estradiol-iron (LO LOESTRIN FE) 1 mg-10 mcg (24)/10 mcg (2) tab Take 1 Tab by mouth daily for 28 days. 3 Package 4        MEDICAL DECISION MAKING    Data: pertinent labs, imaging, medical records and diagnostic tests reviewed and incorporated in diagnosis and treatment plan    Diagnoses/ Problems:   1. Depression    Improving, stable  2. Generalized Anxiety Disorder    Improving, stable    Assessment:  Tomer Singh  is a 50 y.o.  female patient presents with stable, no recurrence of  depression and anxiety. Patient aware of this MD departure from the practice and she will need to have care transferred to a new practice. Risk Scoring- chronic illnesses and prescription drug management    Treatment Plan:  1.   Medications:     Continue Wellbutrin, Lexapro 20mg daily        Orders Placed This Encounter    buPROPion XL (WELLBUTRIN XL) 300 mg XL tablet    escitalopram oxalate (LEXAPRO) 20 mg tablet           The following regarding medications was addressed:    (The risks and benefits of the proposed medication; the potential medication side effects ie    dry mouth, weight gain, GI upset, headache; patient given opportunity to ask questions)       Medication Changes/Adjustments:   Medications Discontinued During This Encounter   Medication Reason    diclofenac EC (VOLTAREN) 75 mg EC tablet Duplicate Order    buPROPion XL (WELLBUTRIN XL) 300 mg XL tablet Reorder    escitalopram oxalate (LEXAPRO) 20 mg tablet Reorder      2. Provided supportive psychotherapy: addressed safety risk, recent stressors, provided validation and assisted with coping skills and problem solving  3. Labs/ tests/ old records/ collateral will be obtained  4.   Follow-up Disposition: Not on File

## 2019-06-12 ENCOUNTER — OFFICE VISIT (OUTPATIENT)
Dept: BEHAVIORAL/MENTAL HEALTH CLINIC | Age: 49
End: 2019-06-12

## 2019-06-12 VITALS
HEIGHT: 64 IN | DIASTOLIC BLOOD PRESSURE: 74 MMHG | HEART RATE: 82 BPM | SYSTOLIC BLOOD PRESSURE: 112 MMHG | WEIGHT: 165 LBS | BODY MASS INDEX: 28.17 KG/M2

## 2019-06-12 DIAGNOSIS — F32.5 MAJOR DEPRESSION IN COMPLETE REMISSION (HCC): ICD-10-CM

## 2019-06-12 DIAGNOSIS — F41.1 GENERALIZED ANXIETY DISORDER: ICD-10-CM

## 2019-06-12 DIAGNOSIS — F33.42 MAJOR DEPRESSION, RECURRENT, FULL REMISSION (HCC): Primary | ICD-10-CM

## 2019-06-12 RX ORDER — ESCITALOPRAM OXALATE 20 MG/1
20 TABLET ORAL DAILY
Qty: 90 TAB | Refills: 1 | Status: SHIPPED | OUTPATIENT
Start: 2019-06-12 | End: 2019-12-19 | Stop reason: SDUPTHER

## 2019-06-12 RX ORDER — BUPROPION HYDROCHLORIDE 300 MG/1
300 TABLET ORAL
Qty: 90 TAB | Refills: 1 | Status: SHIPPED | OUTPATIENT
Start: 2019-06-12 | End: 2019-12-19 | Stop reason: SDUPTHER

## 2019-06-12 NOTE — PATIENT INSTRUCTIONS
Pleases include the following foods in your diet for mood: ? Drink GREEN TEA as often as you can but stop the caffeinated ones before 6 pm 
? Omega 3 Fatty Acids/Fish oil/ or flax seeds, citrus fruits, zoraida, turmeric,chocolates ? Sunflower seeds, Pumpkin seeds,Almonds,  
? Oatmeal, Eggs, grapes, yogurt, Probiotics ( like Activa) ? Vit C, E, D every few  days, 
? Exercise at least 20 minutes/day (walk 10,000 steps) If you feel suicidal after hours, please call the [address] [place] [address] @ [phone] OR GO TO THE NEAREST [employer] YOU MAY ALSO ACCESS THE SUICIDE HOTLINE @ \"SPEAKING OF SUICIDE. COM/RESOURCES\"

## 2019-06-12 NOTE — PROGRESS NOTES
INITIAL PSYCHIATRIC EVALUATION    IDENTIFICATION:      A. Name: Daniel Rodriguez. Age:     52 y.o.      C.   MRN: [id number]       D.   CSN:      [id number]      E. Admission Date: (Not on file)       FIDE   :     1970          SOURCE OF INFORMATION: The patient, past records        CHIEF COMPLAINT:  Transfer from          HPI: Checo Kang is a 53 y/o recently  female who had been struggling with depression and anxiety since age 13. However she did not seek any interventions until her 25s when she attempted suicide by overdose but was not hospitalized. She was  to a much older man who was quite critical and avoided intimacy which precipitated a major depressive episode where she attempted an overdose and was hospitalized at [facility] back in 2013. She met Dr. Charly Braswell and since has been her patient until . She reports relative stability with the current medications, Wellbutrin 300mg XL and Lexapro 20mg. She does not take the Ambien. Current symptoms: several days of feeling tired, depressed, and feeling bad about self,racing thoughts,minimal anxiety,tension,fears,teeth grinding,  Duration of symptoms:      PHQ-9 scores:3/27  HAM-A scores:56  Mood Disorder Questionaire scores:    STRESSORS:  finances    PERSONAL COPING STYLEs :  Problem solve           REVIEW OF  PSYCHIATRIC SYSTEMS:  Patient did not meet criteria for a PTSD, Schizophrenia, Bipolar Affective Disorder, Obsessive Compulsive Disorder, , Agoraphobia, EATING DISORDER,SUBSTANCE USE DISORDER,  Psychotic disorders or ASD.       PAST PSYCHIATRIC HISTORY:   Outpatient Psychiatric treatments:,  Suicide attempts/self inflictive behaviors:2 attempts by overdose  Hospitalizations:  One in 2013 for suicide attempt, [facility]   Medications Tried:  VIibryd, Celexa, Prozac,  ECT:   none  Legal/Assault History:  none    PAST SUBSTANCE ABUSE HISTORY:  Unremarkable    FAMILY PSYCH HISTORY: Unremarkable       SOCIAL HISTORY: Only child born to an intact marriage, attended [address] [place] [address] and obtained her BA in Business administration and has been working as a Branch Liaison for [employer]. She was  for almost 12 years,  from her  and recently . She has no children but has long distance relationship with a friend at the [employer]. Her mother is very supportive, father  a few years ago. NO history of physical or sexual abuse. PAST MEDICAL/SURGICAL HISTORY:  Hypothyroidism    Current Outpatient Medications   Medication Sig Dispense Refill    buPROPion XL (WELLBUTRIN XL) 300 mg XL tablet Take 1 Tab by mouth every morning. 90 Tab 1    escitalopram oxalate (LEXAPRO) 20 mg tablet Take 1 Tab by mouth daily. 90 Tab 1    levothyroxine (SYNTHROID) 150 mcg tablet Take 1 Tab by mouth Daily (before breakfast). 90 Tab 3    cholecalciferol (VITAMIN D3) 1,000 unit tablet Take  by mouth daily.  cyanocobalamin (VITAMIN B-12) 1,000 mcg sublingual tablet Take 1,000 mcg by mouth daily.  Biotin 2,500 mcg cap Take  by mouth.  multivitamin (ONE A DAY) tablet Take 1 Tab by mouth daily. Medical review of systems mainly considered within normal limits expect as noted in history above. Pertinent LABS:Results of TSH/T4, Vit D were WNL      ALLERGIES:   She is allergic to pcn [penicillins].       MENTAL STATUS EXAM:  Orientation person, place, time/date, situation, day of week, month of year and year    Behavior/Eye contact: Good eye contact   Appearance:  Well kempt,appearing his/her age   Motor Behavior:  within normal limits   Speech:  normal pitch, normal volume and non-pressured   Thought Process: goal directed and logical   Thought Content free of delusions, free of hallucinations and obsessions   Suicidal ideations no plan  and no intention   Homicidal ideations none   Mood:  euthymic   Affect:  stable   Memory recent  adequate Memory remote:  adequate   Concentration:  adequate   Abstraction:  abstract   Insight:  good   Reliability good   Perceptual disortions  Absent( auditory,visual,olfactory,tactile), patient denied         ASSESSMENT:  The patient is a 52 y.o.  female with a history of chronic depression that is in remission at this time. However she is taking medications to maintain stability. Suicide/Homicide risk : (Nil,Low, Moderate, High)low    STRENGTHS: stable employment, intelligent, healthy, insightful, compliant, absence of alcohol or drug use, absence of pain  WEAKNESSES: long history of depression, single, few support systems    PROVISIONAL DIAGNOSES:    ICD-10-CM ICD-9-CM   1. Major depression, recurrent, full remission ([facility] F33.42 296.36   2. Generalized anxiety disorder F41.1 300.02   3. Major depression in complete remission ([facility] F32.5 296.26       Orders Placed This Encounter    buPROPion XL (WELLBUTRIN XL) 300 mg XL tablet     Sig: Take 1 Tab by mouth every morning. Dispense:  90 Tab     Refill:  1    escitalopram oxalate (LEXAPRO) 20 mg tablet     Sig: Take 1 Tab by mouth daily. Dispense:  90 Tab     Refill:  1       TREATMENT PLAN:       1. Medications: The Wellbutrin 300mg XL and LExapro 20mg per day were renewed to reflect 90 day supply with 1 refills. The risks and benefits of the proposed medications; the potential medication side effects and consequences of non-compliance were dicussed. The  patient was given opportunity to ask questions             2. Counseling/ psychotherapy:  Psych-education provided:chronic,recurrent depression and need to remain on medications  Discussed rational versus irrational thinking patterns and their consequences. Discussed healthy/adaptive and unhealthy/maladaptive coping. 3.  Labs/ tests/ old records/ collateral: NA    4. Follow up : 6 months    5:  If you feel suicidal after hours, please call the [address] [place] [address] HOTLINE @ [phone] OR GO TO THE NEAREST EMERGENCY ROOM. YOU MAY ALSO ACCESS THE SUICIDE HOTLINE @ \"SPEAKING OF SUICIDE. COM/RESOURCES\"    Referrals/Consults:    Ms. Laura Brown has a reminder for a \"due or due soon\" health maintenance. I have asked that she contact her primary care provider for follow-up on this health maintenance. TIME SPENT FACE TO FACE: 25,70,78,26, 60 MINUTES                  SIGNED:    Sultana ANTONETTE Samaniego MD,   Adult Psychiatrist/Psychosomatic Medicine  6/12/2019

## 2019-08-14 RX ORDER — LEVONORGESTREL AND ETHINYL ESTRADIOL 6-5-10
1 KIT ORAL DAILY
Qty: 3 PACKAGE | Refills: 3 | Status: SHIPPED | OUTPATIENT
Start: 2019-08-14 | End: 2020-01-09 | Stop reason: ALTCHOICE

## 2019-12-19 ENCOUNTER — OFFICE VISIT (OUTPATIENT)
Dept: BEHAVIORAL/MENTAL HEALTH CLINIC | Age: 49
End: 2019-12-19

## 2019-12-19 VITALS
HEIGHT: 64 IN | DIASTOLIC BLOOD PRESSURE: 63 MMHG | BODY MASS INDEX: 28.17 KG/M2 | SYSTOLIC BLOOD PRESSURE: 99 MMHG | WEIGHT: 165 LBS | HEART RATE: 82 BPM

## 2019-12-19 DIAGNOSIS — F41.1 GENERALIZED ANXIETY DISORDER: ICD-10-CM

## 2019-12-19 DIAGNOSIS — F33.42 MAJOR DEPRESSION, RECURRENT, FULL REMISSION (HCC): Primary | ICD-10-CM

## 2019-12-19 DIAGNOSIS — F32.5 MAJOR DEPRESSION IN COMPLETE REMISSION (HCC): ICD-10-CM

## 2019-12-19 DIAGNOSIS — Z63.9 FAMILY PROBLEMS: ICD-10-CM

## 2019-12-19 RX ORDER — ESCITALOPRAM OXALATE 20 MG/1
20 TABLET ORAL DAILY
Qty: 90 TAB | Refills: 1 | Status: SHIPPED | OUTPATIENT
Start: 2019-12-19 | End: 2020-05-26

## 2019-12-19 RX ORDER — BUPROPION HYDROCHLORIDE 300 MG/1
300 TABLET ORAL
Qty: 90 TAB | Refills: 1 | Status: SHIPPED | OUTPATIENT
Start: 2019-12-19 | End: 2020-05-26

## 2019-12-19 SDOH — SOCIAL STABILITY - SOCIAL INSECURITY: PROBLEM RELATED TO PRIMARY SUPPORT GROUP, UNSPECIFIED: Z63.9

## 2019-12-19 NOTE — PROGRESS NOTES
Psychiatric Outpatient Progress Note    Account Number:  [id number]  Name: Kaz Zapata    SUBJECTIVE:     CHIEF COMPLAINT:    HPI:  Kaz Zapata is a 52 y.o. female and was seen today for follow-up of psychiatric condition and psychotropic medication management. Aundrea Klein is a 51 y/o recently  female who had been struggling with depression and anxiety since age [age]. However she did not seek any interventions until her 25s when she attempted suicide by overdose but was not hospitalized. She was  to a much older man who was quite critical and avoided intimacy which precipitated a major depressive episode where she attempted an overdose and was hospitalized at [facility] back in Feb. 25, 2013. She met Dr. Xavi Harris and since has been her patient until December 29,2018. She reports relative stability with the current medications, Wellbutrin 300mg XL and Lexapro 20mg. She does not take the Ambien.       Current symptoms: several days of feeling tired, depressed, and feeling bad about self,racing thoughts,minimal anxiety,tension,fears,teeth grinding,  Duration of symptoms:        PHQ-9 scores:3/27  HAM-A scores:11/56  Mood Disorder Questionaire scores:1/13     STRESSORS:  finances     PERSONAL COPING STYLEs :  Problem solve            REVIEW OF  PSYCHIATRIC SYSTEMS:  Patient did not meet criteria for a PTSD, Schizophrenia, Bipolar Affective Disorder, Obsessive Compulsive Disorder, , Agoraphobia, EATING DISORDER,SUBSTANCE USE DISORDER,  Psychotic disorders or ASD.        PAST PSYCHIATRIC HISTORY:   Outpatient Psychiatric treatments:,  Suicide attempts/self inflictive behaviors:2 attempts by overdose  Hospitalizations:  One in Feb 2013 for suicide attempt, [facility]   Medications Tried:  VIibryd, Celexa, Prozac,  ECT:   none  Legal/Assault History:  none     PAST SUBSTANCE ABUSE HISTORY:  Unremarkable     FAMILY PSYCH HISTORY: Unremarkable        SOCIAL HISTORY: Only child born to an intact marriage, attended [employer] and obtained her BA in Business administration and has been working as a Branch Liaison for [employer] She was  for almost 12 years,  from her  and recently . She has no children but has long distance relationship with a friend at the [employer]. Her mother is very supportive, father  a few years ago. NO history of physical or sexual abuse.      PAST MEDICAL/SURGICAL HISTORY:  Hypothyroidism  ----------------------------------------------------------------------------------------------------------------------------------------------------------------------------------------------------------------------------------------------------------------------------------------------------------------------------------------------------------------------------------------------------------------------------------------------------------------------------------------------------------  Course of events since last visit: The patient returns for her scheduled appointment. She  was placed on Lexapro 20mg and Wellbutrin XL 300mg with continued good results. However she has been distressed over her mother's situation ( parkinson disease, living alone 6 hours away), breaking up with two boyfriends since she was last seen. She also became ill in November and still recovering from it. She is requesting a therapist because she also changed jobs, now being paid less, struggling with paying her mortgage. Fam/Social STATUS  OR changes: mother needs full care/supervision , no partner in life, both boyfriends broke up with her    Side Effects:  none      REVIEW OF SYSTEMS:  Pertinent items are noted in HPI/above.     Visit Vitals  BP 99/63 (BP 1 Location: Left arm, BP Patient Position: Sitting)   Pulse 82   Ht 5' 4\" (1.626 m)   Wt 74.8 kg (165 lb)   BMI 28.32 kg/m²       OBJECTIVE:                 Mental Status exam: WNL except for      Attitude/Behavior cooperative,     Eye Contact    appropriate   Appearance:  age appropriate and well dressed   Motor Behavior/Gait:  within normal limits   Speech:  normal pitch, normal volume and non-pressured   Thought Process: goal directed and within normal limits linear   Thought Content free of delusions and obsessions   Perceptual distortions  Patient denied any visual,auditory,olfactory or gustatory hallucinations. No illusions were reported or noted eithter   Suicidal ideations no plan  and no intention   Homicidal ideations no plan  and no intention   Mood:  sad   Affect:  mood-congruent     Orientation/sensorium  Alert and oriented to  date,place, situation and persons   Memory recent  adequate   Memory remote:  adequate   Concentration:  adequate   Abstraction:  abstract   Judgment &Insight:  good   Reliability good           MEDICAL DECISION MAKING:     Data REVIEWED: pertinent labs, imaging, medical records and diagnostic tests reviewed and incorporated in diagnosis and treatment plan    Allergies   Allergen Reactions    Pcn [Penicillins] Rash        Current Outpatient Medications   Medication Sig Dispense Refill    buPROPion XL (WELLBUTRIN XL) 300 mg XL tablet Take 1 Tab by mouth every morning. 90 Tab 1    escitalopram oxalate (LEXAPRO) 20 mg tablet Take 1 Tab by mouth daily. 90 Tab 1    levothyroxine (SYNTHROID) 150 mcg tablet Take 1 Tab by mouth Daily (before breakfast). 90 Tab 3    cholecalciferol (VITAMIN D3) 1,000 unit tablet Take  by mouth daily.  cyanocobalamin (VITAMIN B-12) 1,000 mcg sublingual tablet Take 1,000 mcg by mouth daily.  Biotin 2,500 mcg cap Take  by mouth.  levonorgestrel-ethinyl estradiol (MYZILRA) 50-30 (6)/75-40 (5)/125-30(10) tab Take 1 Tab by mouth daily. 3 Package 3          ICD-10-CM ICD-9-CM    1. Major depression, recurrent, full remission ([facility] F33.42 296.36    2.  Generalized anxiety disorder F41.1 300.02 buPROPion XL (WELLBUTRIN XL) 300 mg XL tablet escitalopram oxalate (LEXAPRO) 20 mg tablet   3. Family problems Z63.9 V61.9    4. Major depression in complete remission ([facility]) F32.5 296.26 buPROPion XL (WELLBUTRIN XL) 300 mg XL tablet      escitalopram oxalate (LEXAPRO) 20 mg tablet       Orders Placed This Encounter    buPROPion XL (WELLBUTRIN XL) 300 mg XL tablet     Sig: Take 1 Tab by mouth every morning. Dispense:  90 Tab     Refill:  1    escitalopram oxalate (LEXAPRO) 20 mg tablet     Sig: Take 1 Tab by mouth daily. Dispense:  90 Tab     Refill:  1           Assessment:   Shanice Carney  is a 52 y.o.  female  is  responding to treatment. Symptoms have  Remained in remission with some mild setbacks  The patient is a 52 y.o.  female with a history of chronic depression that is in remission at this time. However she is taking medications to maintain stability.      Suicide/Homicide risk : (Nil,Low, Moderate, High)low     STRENGTHS: stable employment, intelligent, healthy, insightful, compliant, absence of alcohol or drug use, absence of pain  WEAKNESSES: long history of depression, single, few support systems    Patient denies SI/HI/SIB  SUICIDE RISK: low      Treatment Plan  Treatment plan reviewed with patient-including diagnosis and medications:    1. Medication Changes/Adjustments: Will continue the current medications but will refer for psychotherapy    Current Outpatient Medications   Medication Sig Dispense Refill    buPROPion XL (WELLBUTRIN XL) 300 mg XL tablet Take 1 Tab by mouth every morning. 90 Tab 1    escitalopram oxalate (LEXAPRO) 20 mg tablet Take 1 Tab by mouth daily. 90 Tab 1    levothyroxine (SYNTHROID) 150 mcg tablet Take 1 Tab by mouth Daily (before breakfast). 90 Tab 3    cholecalciferol (VITAMIN D3) 1,000 unit tablet Take  by mouth daily.  cyanocobalamin (VITAMIN B-12) 1,000 mcg sublingual tablet Take 1,000 mcg by mouth daily.  Biotin 2,500 mcg cap Take  by mouth.       levonorgestrel-ethinyl estradiol (MYZILRA) 50-30 (6)/75-40 (5)/125-30(10) tab Take 1 Tab by mouth daily. 3 Package 3             The risks, benefits of the proposed medication and the potential medication side effects (ie dry mouth, weight gain, GI upset, headache,tremors, extrapyramidal side effects, sexual dysfunction, suicidal thoughts,sweating) etc.were discussed . The potential for dependence and addiction discussed. The patient was given the opportunity to ask questions. The patient was informed of the consequences of refusing medications and non-compliance. Patient agreed with this plan. PSYCHOTHERAPY:  approx 14 minutes  Supportive/Cognitive Behavioral/Solution Focused psychotherapy provided  Discussed rational versus irrational thinking patterns and their consequences. Discussed healthy/adaptive and unhealthy/maladaptive coping. Homework given regarding:   Psycho-education/ handouts provided:  none    LABORATORY ORDERS & OR REFERRALS:     Patient instructed to call or e-mail to [facility] with any side effects, questions or issues. Ms. Pamela Carmona has a reminder for a \"due or due soon\" health maintenance. I have asked that she contact her primary care provider for follow-up on this health maintenance. Caden Fuentes is stable. Follow-up and Dispositions    · Return in about 6 months (around 6/19/2020). Charlie Taylor MD  12/19/2019      TIME SPENT FACE TO FACE WITH THE PATIENT: 15 MINUTES    There are other unrelated non-urgent complaints, but due to the busy schedule and the amount of time I've already spent with her, time does not permit me to address these routine issues at today's visit. I've requested another appointment to review these additional issues.

## 2020-01-08 ENCOUNTER — OFFICE VISIT (OUTPATIENT)
Dept: FAMILY MEDICINE CLINIC | Age: 50
End: 2020-01-08

## 2020-01-08 VITALS
BODY MASS INDEX: 29.53 KG/M2 | WEIGHT: 173 LBS | TEMPERATURE: 98.7 F | SYSTOLIC BLOOD PRESSURE: 137 MMHG | RESPIRATION RATE: 16 BRPM | HEIGHT: 64 IN | DIASTOLIC BLOOD PRESSURE: 82 MMHG | OXYGEN SATURATION: 99 % | HEART RATE: 77 BPM

## 2020-01-08 DIAGNOSIS — Z00.00 ROUTINE ADULT HEALTH MAINTENANCE: Primary | ICD-10-CM

## 2020-01-08 DIAGNOSIS — E78.00 HIGH CHOLESTEROL: ICD-10-CM

## 2020-01-08 DIAGNOSIS — Z76.89 ENCOUNTER TO ESTABLISH CARE: ICD-10-CM

## 2020-01-08 DIAGNOSIS — Z12.39 BREAST CANCER SCREENING: ICD-10-CM

## 2020-01-08 DIAGNOSIS — Z12.11 COLON CANCER SCREENING: ICD-10-CM

## 2020-01-08 DIAGNOSIS — E03.9 ACQUIRED HYPOTHYROIDISM: ICD-10-CM

## 2020-01-08 DIAGNOSIS — Z23 ENCOUNTER FOR IMMUNIZATION: ICD-10-CM

## 2020-01-08 DIAGNOSIS — Z11.3 ROUTINE SCREENING FOR STI (SEXUALLY TRANSMITTED INFECTION): ICD-10-CM

## 2020-01-08 NOTE — PATIENT INSTRUCTIONS
Well Visit, Women 48 to 72: Care Instructions  Your Care Instructions    Physical exams can help you stay healthy. Your doctor has checked your overall health and may have suggested ways to take good care of yourself. He or she also may have recommended tests. At home, you can help prevent illness with healthy eating, regular exercise, and other steps. Follow-up care is a key part of your treatment and safety. Be sure to make and go to all appointments, and call your doctor if you are having problems. It's also a good idea to know your test results and keep a list of the medicines you take. How can you care for yourself at home? · Reach and stay at a healthy weight. This will lower your risk for many problems, such as obesity, diabetes, heart disease, and high blood pressure. · Get at least 30 minutes of exercise on most days of the week. Walking is a good choice. You also may want to do other activities, such as running, swimming, cycling, or playing tennis or team sports. · Do not smoke. Smoking can make health problems worse. If you need help quitting, talk to your doctor about stop-smoking programs and medicines. These can increase your chances of quitting for good. · Protect your skin from too much sun. When you're outdoors from 10 a.m. to 4 p.m., stay in the shade or cover up with clothing and a hat with a wide brim. Wear sunglasses that block UV rays. Even when it's cloudy, put broad-spectrum sunscreen (SPF 30 or higher) on any exposed skin. · See a dentist one or two times a year for checkups and to have your teeth cleaned. · Wear a seat belt in the car. Follow your doctor's advice about when to have certain tests. These tests can spot problems early. · Cholesterol. Your doctor will tell you how often to have this done based on your age, family history, or other things that can increase your risk for heart attack and stroke. · Blood pressure.  Have your blood pressure checked during a routine doctor visit. Your doctor will tell you how often to check your blood pressure based on your age, your blood pressure results, and other factors. · Mammogram. Ask your doctor how often you should have a mammogram, which is an X-ray of your breasts. A mammogram can spot breast cancer before it can be felt and when it is easiest to treat. · Pap test and pelvic exam. Ask your doctor how often you should have a Pap test. You may not need to have a Pap test as often as you used to. · Vision. Have your eyes checked every year or two or as often as your doctor suggests. Some experts recommend that you have yearly exams for glaucoma and other age-related eye problems starting at age 48. · Hearing. Tell your doctor if you notice any change in your hearing. You can have tests to find out how well you hear. · Diabetes. Ask your doctor whether you should have tests for diabetes. · Colorectal cancer. Your risk for colorectal cancer gets higher as you get older. Some experts say that adults should start regular screening at age 48 and stop at age 76. Others say to start before age 48 or continue after age 76. Talk with your doctor about your risk and when to start and stop screening. · Thyroid disease. Talk to your doctor about whether to have your thyroid checked as part of a regular physical exam. Women have an increased chance of a thyroid problem. · Osteoporosis. You should begin tests for bone density at age 72. If you are younger than 72, ask your doctor whether you have factors that may increase your risk for this disease. You may want to have this test before age 72. · Heart attack and stroke risk. At least every 4 to 6 years, you should have your risk for heart attack and stroke assessed. Your doctor uses factors such as your age, blood pressure, cholesterol, and whether you smoke or have diabetes to show what your risk for a heart attack or stroke is over the next 10 years.   When should you call for help?  Watch closely for changes in your health, and be sure to contact your doctor if you have any problems or symptoms that concern you. Where can you learn more? Go to http://jason-francisco javier.info/. Enter U667 in the search box to learn more about \"Well Visit, Women 50 to 72: Care Instructions. \"  Current as of: December 13, 2018  Content Version: 12.2  © 5413-6637 [employer] Care instructions adapted under license by [employer] (which disclaims liability or warranty for this information). If you have questions about a medical condition or this instruction, always ask your healthcare professional. [place] [address] any warranty or liability for your use of this information. Recombinant Zoster (Shingles) Vaccine, RZV: What you need to know  Why get vaccinated? Shingles (also called herpes zoster, or just zoster) is a painful skin rash, often with blisters. Shingles is caused by the varicella zoster virus, the same virus that causes chickenpox. After you have chickenpox, the virus stays in your body and can cause shingles later in life. You can't catch shingles from another person. However, a person who has never had chickenpox (or chickenpox vaccine) could get chickenpox from someone with shingles. A shingles rash usually appears on one side of the face or body and heals within 2 to 4 weeks. Its main symptom is pain, which can be severe. Other symptoms can include fever, headache, chills, and upset stomach. Very rarely, a shingles infection can lead to pneumonia, hearing problems, blindness, brain inflammation (encephalitis), or death. For about 1 person in 5, severe pain can continue even long after the rash has cleared up. This long-lasting pain is called post-herpetic neuralgia (PHN). Shingles is far more common in people 48years of age and older than in younger people, and the risk increases with age.  It is also more common in people whose immune system is weakened because of a disease such as cancer, or by drugs such as steroids or chemotherapy. At least 1 million people a year in the [facility] get shingles. Shingles vaccine (recombinant)  Recombinant shingles vaccine was approved by FDA in 2017 for the prevention of shingles. In clinical trials, it was more than 90% effective in preventing shingles. It can also reduce the likelihood of PHN. Two doses, 2 to 6 months apart, are recommended for adults 48 and older. This vaccine is also recommended for people who have already gotten the live shingles vaccine (Zostavax). There is no live virus in this vaccine. Some people should not get this vaccine  Tell your vaccine provider if you:  · Have any severe, life-threatening allergies. A person who has ever had a life-threatening allergic reaction after a dose of recombinant shingles vaccine, or has a severe allergy to any component of this vaccine, may be advised not to be vaccinated. Ask your health care provider if you want information about vaccine components. · Are pregnant or breastfeeding. There is not much information about use of recombinant shingles vaccine in pregnant or nursing women. Your healthcare provider might recommend delaying vaccination. · Are not feeling well. If you have a mild illness, such as a cold, you can probably get the vaccine today. If you are moderately or severely ill, you should probably wait until you recover. Your doctor can advise you. Risks of a vaccine reaction  With any medicine, including vaccines, there is a chance of reactions. After recombinant shingles vaccination, a person might experience:  · Pain, redness, soreness, or swelling at the site of the injection  · Headache, muscle aches, fever, shivering, fatigue  In clinical trials, most people got a sore arm with mild or moderate pain after vaccination, and some also had redness and swelling where they got the shot.  Some people felt tired, had muscle pain, a headache, shivering, fever, stomach pain, or nausea. About 1 out of 6 people who got recombinant zoster vaccine experienced side effects that prevented them from doing regular activities. Symptoms went away on their own in about 2 to 3 days. Side effects were more common in younger people. You should still get the second dose of recombinant zoster vaccine even if you had one of these reactions after the first dose. Other things that could happen after this vaccine:  · People sometimes faint after medical procedures, including vaccination. Sitting or lying down for about 15 minutes can help prevent fainting and injuries caused by a fall. Tell your provider if you feel dizzy or have vision changes or ringing in the ears. · Some people get shoulder pain that can be more severe and longer-lasting than routine soreness that can follow injections. This happens very rarely. · Any medication can cause a severe allergic reaction. Such reactions to a vaccine are estimated at about 1 in a million doses, and would happen within a few minutes to a few hours after the vaccination. As with any medicine, there is a very remote chance of a vaccine causing a serious injury or death. The safety of vaccines is always being monitored. For more information, visit: www.cdc.gov/vaccinesafety/  What if there is a serious problem? What should I look for? · Look for anything that concerns you, such as signs of a severe allergic reaction, very high fever, or unusual behavior. Signs of a severe allergic reaction can include hives, swelling of the face and throat, difficulty breathing, a fast heartbeat, dizziness, and weakness. These would usually start a few minutes to a few hours after the vaccination. What should I do? · If you think it is a severe allergic reaction or other emergency that can't wait, call 9-1-1 or get to the nearest hospital. Otherwise, call your health care provider.   · Afterward, the reaction should be reported to the Vaccine Adverse Event Reporting System (VAERS). Your doctor should file this report, or you can do it yourself through the VAERS website at www.vaers. hhs.gov, or by calling 0-660.716.9906. VAERS does not give medical advice. .  How can I learn more? · Ask your health care provider. He or she can give you the vaccine package insert or suggest other sources of information. · Call your local or state health department. · Contact the Centers for Disease Control and Prevention (CDC):  ? Call 4-262.191.3405 (4-543-IKL-INFO) or  ? Visit CDC's website at www.cdc.gov/vaccines  Vaccine Information Statement (Interim)  Recombinant Zoster Vaccine  2/12/2018  [facility] and [facility] for Disease Control and Prevention  Many Vaccine Information Statements are available in [place] and other languages. See www.immunize.org/vis. Hojas de Información Sobre Vacunas están disponibles en Español y en muchos otros idiomas. Visite Teddy.si  Care instructions adapted under license by [employer] (which disclaims liability or warranty for this information). If you have questions about a medical condition or this instruction, always ask your healthcare professional. [address] any warranty or liability for your use of this information.

## 2020-01-08 NOTE — PROGRESS NOTES
[facility] Note  HPI:   Lorraine Swan is a 48 y.o. female who presents to establish care. Previous provider: Kinjal Duke NP  Chief Complaint   Patient presents with    New Patient     meet new provider.  Establish Care    Physical     pt would like her labs done this morning. Endocrine Review  Patient is seen for evaluation of hypothyroidism. Diagnosed in childhood. Since last visit: no changes. Reports medication compliance: all the time and is taking separate from all her other meds. She reports the following concerns/problems/med side effects: none. Lab review: TSH 0.248 (L), T4  1.56. Denies skin, nail or hair changes. Denies constipation, diarrhea. Denies chest pain, palpations, tremor. She has appointment tomorrow with OBGYN for annual pap smear. New male partner. She is requesting STI screening. Current Outpatient Medications   Medication Sig Dispense Refill    varicella-zoster recombinant, PF, (SHINGRIX, PF,) 50 mcg/0.5 mL susr injection 0.5 mL by IntraMUSCular route once for 1 dose. Repeat dose after 2-6 months 0.5 mL 1    buPROPion XL (WELLBUTRIN XL) 300 mg XL tablet Take 1 Tab by mouth every morning. 90 Tab 1    escitalopram oxalate (LEXAPRO) 20 mg tablet Take 1 Tab by mouth daily. 90 Tab 1    levonorgestrel-ethinyl estradiol (MYZILRA) 50-30 (6)/75-40 (5)/125-30(10) tab Take 1 Tab by mouth daily. 3 Package 3    levothyroxine (SYNTHROID) 150 mcg tablet Take 1 Tab by mouth Daily (before breakfast). 90 Tab 3    cholecalciferol (VITAMIN D3) 1,000 unit tablet Take  by mouth daily.  cyanocobalamin (VITAMIN B-12) 1,000 mcg sublingual tablet Take 1,000 mcg by mouth daily.  Biotin 2,500 mcg cap Take  by mouth.         Allergies   Allergen Reactions    Pcn [Penicillins] Rash      Patient Active Problem List   Diagnosis Code    Hypothyroidism E03.9    Headache(784.0) R51    Pap smear for cervical cancer screening Z12.4    Major depression, recurrent, full remission ([email or web address] [email or web address].) F33.42    Generalized anxiety disorder F41.1     Past Medical History:   Diagnosis Date    Abnormal Papanicolaou smear of cervix     Depression with anxiety 1995    h/o psychiatric hosp 2013 ()    Family history of skin cancer     History of migraine     preivoulsy required Botox    Sun-damaged skin     childhood and young adult    Sunburn, blistering     childhood    Tanning bed exposure     Unspecified hypothyroidism       Past Surgical History:   Procedure Laterality Date    HX BREAST AUGMENTATION  2006    bilateral    HX LEEP PROCEDURE  2004    HX UROLOGICAL      urethral dilation 11years of age   [facility] IMPLANT BREAST SILICONE/EQ Bilateral     2006 [place]    DUSTIN MAMMOGRAM DIGITAL BILATERAL  5/13      Patient's last menstrual period was 01/01/2020. Family History   Problem Relation Age of Onset    Hypertension Father     Cancer Father         AML    Arthritis-osteo Mother         TKR    Hypertension Mother     Parkinson's Disease Mother     Bleeding Prob Maternal Aunt 70    Breast Cancer Maternal Aunt         46s    Cancer Maternal Grandmother         stomach    Breast Cancer Cousin 39      Social History     Socioeconomic History    Marital status:      Spouse name: .  Number of children: 0    Years of education: college    Highest education level: Not on file   Occupational History    Occupation: [address] [place] [address]      Employer: [address] [place] [address] Financial resource strain: Not on file    Food insecurity:     Worry: Not on file     Inability: Not on file    Transportation needs:     Medical: Not on file     Non-medical: Not on file   Tobacco Use    Smoking status: Never Smoker    Smokeless tobacco: Never Used   Substance and Sexual Activity    Alcohol use:  Yes     Alcohol/week: 1.0 standard drinks     Types: 1 Standard drinks or equivalent per week     Frequency: 2-4 times a month     Drinks per session: 1 or 2 Binge frequency: Never     Comment: 1 drink most weeks    Drug use: No    Sexual activity: Yes     Partners: Male     Birth control/protection: Pill   Lifestyle    Physical activity:     Days per week: Not on file     Minutes per session: Not on file    Stress: Not on file   Relationships    Social connections:     Talks on phone: Not on file     Gets together: Not on file     Attends Quaker service: Not on file     Active member of club or organization: Not on file     Attends meetings of clubs or organizations: Not on file     Relationship status: Not on file    Intimate partner violence:     Fear of current or ex partner: Not on file     Emotionally abused: Not on file     Physically abused: Not on file     Forced sexual activity: Not on file   Other Topics Concern     Service Not Asked    Blood Transfusions Not Asked    Caffeine Concern Not Asked    Occupational Exposure Not Asked   Jordan Proud Hazards Not Asked    Sleep Concern Not Asked    Stress Concern Not Asked    Weight Concern Not Asked    Special Diet Not Asked    Back Care Not Asked    Exercise No     Comment: not exercising     Bike Helmet Not Asked    Seat Belt Not Asked    Self-Exams Not Asked   Social History Narrative    Not on file        ROS:   Review of Systems   Constitutional: Negative for chills, diaphoresis, fever, malaise/fatigue and weight loss. No unexplained weight changes   HENT: Negative for congestion, hearing loss, sore throat and tinnitus. Eyes: Negative for blurred vision. Respiratory: Negative for cough, shortness of breath and wheezing. Cardiovascular: Negative for chest pain, palpitations, orthopnea, claudication and leg swelling. Gastrointestinal: Negative for abdominal pain, blood in stool, constipation, diarrhea, heartburn, melena, nausea and vomiting. Genitourinary: Negative for dysuria, frequency and urgency.         No vaginal lesions or unusual discharge  Denies breast lumps or nipple changes    Musculoskeletal: Negative for joint pain and myalgias. Skin: Negative for itching and rash. Neurological: Negative for dizziness, seizures, weakness and headaches. Endo/Heme/Allergies: Negative for polydipsia. Psychiatric/Behavioral: Negative for depression. The patient is not nervous/anxious and does not have insomnia. Physical Exam:     Visit Vitals  /82 (BP 1 Location: Left arm)   Pulse 77   Temp 98.7 °F (37.1 °C) (Oral)   Resp 16   Ht 5' 4\" (1.626 m)   Wt 173 lb (78.5 kg)   LMP 01/01/2020   SpO2 99%   BMI 29.70 kg/m²        Vitals and Nurse Documentation reviewed. Physical Exam  Vitals signs and nursing note reviewed. Constitutional:       Appearance: Normal appearance. She is not diaphoretic. HENT:      Head: Normocephalic and atraumatic. Right Ear: Hearing, tympanic membrane, ear canal and external ear normal. No middle ear effusion. Tympanic membrane is not erythematous. Left Ear: Hearing, ear canal and external ear normal.  No middle ear effusion. Tympanic membrane is not erythematous. Nose: Nose normal. No nasal deformity or septal deviation. Mouth/Throat:      Mouth: Mucous membranes are not pale, not dry and not cyanotic. Dentition: Normal dentition. No dental caries. Pharynx: Uvula midline. No oropharyngeal exudate. Eyes:      General: Lids are normal.      Conjunctiva/sclera: Conjunctivae normal.      Right eye: Right conjunctiva is not injected. Left eye: Left conjunctiva is not injected. Pupils: Pupils are equal, round, and reactive to light. Neck:      Musculoskeletal: Normal range of motion and neck supple. Thyroid: No thyroid mass or thyromegaly. Trachea: Phonation normal. No tracheal deviation. Cardiovascular:      Rate and Rhythm: Normal rate and regular rhythm. Pulses:           Radial pulses are 2+ on the right side and 2+ on the left side.         Dorsalis pedis pulses are 2+ on the right side and 2+ on the left side. Heart sounds: Normal heart sounds, S1 normal and S2 normal. No murmur. No friction rub. No gallop. Pulmonary:      Effort: Pulmonary effort is normal. No accessory muscle usage or respiratory distress. Breath sounds: Normal breath sounds. No decreased breath sounds, wheezing, rhonchi or rales. Chest:      Chest wall: No tenderness. Abdominal:      General: Bowel sounds are normal. There is no distension or abdominal bruit. Palpations: Abdomen is soft. Tenderness: There is no tenderness. Musculoskeletal: Normal range of motion. General: No tenderness or deformity. Lymphadenopathy:      Head:      Right side of head: No submental, submandibular, tonsillar, preauricular, posterior auricular or occipital adenopathy. Left side of head: No submental, submandibular, tonsillar, preauricular, posterior auricular or occipital adenopathy. Cervical: No cervical adenopathy. Upper Body:      Right upper body: No supraclavicular adenopathy. Left upper body: No supraclavicular adenopathy. Skin:     General: Skin is warm and dry. Findings: No rash. Nails: There is no clubbing. Neurological:      Mental Status: She is alert and oriented to person, place, and time. Cranial Nerves: Cranial nerves are intact. Sensory: Sensation is intact. Gait: Gait is intact. Deep Tendon Reflexes: Reflexes are normal and symmetric. Psychiatric:         Mood and Affect: Mood and affect normal.       Assessment/ Plan:   [employer] were discussed including hours of operation, on-call providers, and MyChart. Diagnoses and all orders for this visit:    1. Routine adult health maintenance: Healthy appearing  48 y.o. female, without abnormal findings on exam  -     METABOLIC PANEL, COMPREHENSIVE  -     CBC WITH AUTOMATED DIFF  -     HEMOGLOBIN A1C WITH EAG    2. Encounter to establish care    3.  Acquired hypothyroidism: Presumed stable, asymptomatic. Repeat labs today.   -     TSH AND FREE T4    4. High cholesterol: Non-fasting labs today. -     LIPID PANEL    5. Breast cancer screening: annual GYN visit scheduled tomorrow. -     [facility] MAMMO BI SCREENING INCL CAD; Future    6. Colon cancer screening: asymptomatic. Agreeable to screening.   -     REFERRAL TO GASTROENTEROLOGY    7. Encounter for immunization: Risks and benefits of immunization reviewed, VIS provided. -     varicella-zoster recombinant, PF, (SHINGRIX, PF,) 50 mcg/0.5 mL susr injection; 0.5 mL by IntraMUSCular route once for 1 dose. Repeat dose after 2-6 months    8. Routine screening for STI (sexually transmitted infection): Will obtain serum testing today, advised to discuss with GYN to collect gonorrhea and chlamydia with Pap smear tomorrow.    -     HIV 1/2 AG/AB, 4TH GENERATION,W RFLX CONFIRM  -     CHRONIC HEPATITIS PANEL  -     RPR      Follow-up and Dispositions    · Return in about 1 year (around 1/8/2021) for Routine Physical Exam.        Discussed expected course/resolution/complications of diagnosis in detail with patient.    Medication risks/benefits/costs/interactions/alternatives discussed with patient.    Pt was given an after visit summary which includes diagnoses, current medications & vitals.  Pt expressed understanding with the diagnosis and plan

## 2020-01-08 NOTE — PROGRESS NOTES
Chief Complaint   Patient presents with    New Patient     meet new provider.  Establish Care    Physical     pt would like her labs done this morning. \"REVIEWED RECORD IN PREPARATION FOR VISIT AND HAVE OBTAINED THE NECESSARY DOCUMENTATION\"  1. Have you been to the ER, urgent care clinic since your last visit? Hospitalized since your last visit? No    2. Have you seen or consulted any other health care providers outside of the [address] [place] [address] since your last visit? Include any pap smears or colon screening.  No

## 2020-01-09 ENCOUNTER — OFFICE VISIT (OUTPATIENT)
Dept: OBGYN CLINIC | Age: 50
End: 2020-01-09

## 2020-01-09 VITALS
WEIGHT: 170 LBS | BODY MASS INDEX: 29.02 KG/M2 | SYSTOLIC BLOOD PRESSURE: 122 MMHG | HEIGHT: 64 IN | DIASTOLIC BLOOD PRESSURE: 84 MMHG

## 2020-01-09 DIAGNOSIS — Z11.3 SCREENING FOR VENEREAL DISEASE: ICD-10-CM

## 2020-01-09 DIAGNOSIS — Z11.51 SCREENING FOR HUMAN PAPILLOMAVIRUS: ICD-10-CM

## 2020-01-09 DIAGNOSIS — Z01.419 WELL WOMAN EXAM: Primary | ICD-10-CM

## 2020-01-09 LAB
ALBUMIN SERPL-MCNC: 3.9 G/DL (ref 3.5–5.5)
ALBUMIN/GLOB SERPL: 1.6 {RATIO} (ref 1.2–2.2)
ALP SERPL-CCNC: 51 IU/L (ref 39–117)
ALT SERPL-CCNC: 12 IU/L (ref 0–32)
AST SERPL-CCNC: 11 IU/L (ref 0–40)
BASOPHILS # BLD AUTO: 0.1 X10E3/UL (ref 0–0.2)
BASOPHILS NFR BLD AUTO: 1 %
BILIRUB SERPL-MCNC: 0.2 MG/DL (ref 0–1.2)
BUN SERPL-MCNC: 11 MG/DL (ref 6–24)
BUN/CREAT SERPL: 12 (ref 9–23)
CALCIUM SERPL-MCNC: 8.9 MG/DL (ref 8.7–10.2)
CHLORIDE SERPL-SCNC: 105 MMOL/L (ref 96–106)
CHOLEST SERPL-MCNC: 183 MG/DL (ref 100–199)
CO2 SERPL-SCNC: 23 MMOL/L (ref 20–29)
COMMENT, 144067: NORMAL
CREAT SERPL-MCNC: 0.89 MG/DL (ref 0.57–1)
EOSINOPHIL # BLD AUTO: 0.1 X10E3/UL (ref 0–0.4)
EOSINOPHIL NFR BLD AUTO: 1 %
ERYTHROCYTE [DISTWIDTH] IN BLOOD BY AUTOMATED COUNT: 12.8 % (ref 11.7–15.4)
EST. AVERAGE GLUCOSE BLD GHB EST-MCNC: 108 MG/DL
GLOBULIN SER CALC-MCNC: 2.4 G/DL (ref 1.5–4.5)
GLUCOSE SERPL-MCNC: 94 MG/DL (ref 65–99)
HBA1C MFR BLD: 5.4 % (ref 4.8–5.6)
HBV CORE AB SERPL QL IA: NEGATIVE
HBV CORE IGM SERPL QL IA: NEGATIVE
HBV E AB SERPL QL IA: NEGATIVE
HBV E AG SERPL QL IA: NEGATIVE
HBV SURFACE AB SER QL: NON REACTIVE
HBV SURFACE AG SERPL QL IA: NEGATIVE
HCT VFR BLD AUTO: 37.3 % (ref 34–46.6)
HCV AB S/CO SERPL IA: <0.1 S/CO RATIO (ref 0–0.9)
HDLC SERPL-MCNC: 89 MG/DL
HGB BLD-MCNC: 12.3 G/DL (ref 11.1–15.9)
HIV 1+2 AB+HIV1 P24 AG SERPL QL IA: NON REACTIVE
IMM GRANULOCYTES # BLD AUTO: 0 X10E3/UL (ref 0–0.1)
IMM GRANULOCYTES NFR BLD AUTO: 0 %
INTERPRETATION, 910389: NORMAL
LDLC SERPL CALC-MCNC: 78 MG/DL (ref 0–99)
LYMPHOCYTES # BLD AUTO: 1.9 X10E3/UL (ref 0.7–3.1)
LYMPHOCYTES NFR BLD AUTO: 26 %
MCH RBC QN AUTO: 30.3 PG (ref 26.6–33)
MCHC RBC AUTO-ENTMCNC: 33 G/DL (ref 31.5–35.7)
MCV RBC AUTO: 92 FL (ref 79–97)
MONOCYTES # BLD AUTO: 0.3 X10E3/UL (ref 0.1–0.9)
MONOCYTES NFR BLD AUTO: 5 %
NEUTROPHILS # BLD AUTO: 4.9 X10E3/UL (ref 1.4–7)
NEUTROPHILS NFR BLD AUTO: 67 %
PLATELET # BLD AUTO: 246 X10E3/UL (ref 150–450)
POTASSIUM SERPL-SCNC: 4.4 MMOL/L (ref 3.5–5.2)
PROT SERPL-MCNC: 6.3 G/DL (ref 6–8.5)
RBC # BLD AUTO: 4.06 X10E6/UL (ref 3.77–5.28)
RPR SER QL: NON REACTIVE
SODIUM SERPL-SCNC: 142 MMOL/L (ref 134–144)
T4 FREE SERPL-MCNC: 1.48 NG/DL (ref 0.82–1.77)
TRIGL SERPL-MCNC: 82 MG/DL (ref 0–149)
TSH SERPL DL<=0.005 MIU/L-ACNC: 0.16 UIU/ML (ref 0.45–4.5)
VLDLC SERPL CALC-MCNC: 16 MG/DL (ref 5–40)
WBC # BLD AUTO: 7.3 X10E3/UL (ref 3.4–10.8)

## 2020-01-09 RX ORDER — NORETHINDRONE ACETATE AND ETHINYL ESTRADIOL 1MG-20(21)
1 KIT ORAL DAILY
Qty: 3 DOSE PACK | Refills: 4 | Status: SHIPPED | OUTPATIENT
Start: 2020-01-09 | End: 2020-02-06

## 2020-01-09 NOTE — PROGRESS NOTES
Annual Visit    Praneeth Lee is a 48 y.o. G0 presenting for annual visit. Doing well. No concerns today. Still with regular monthly menses, on standard dose OCPs, has been using this pill for many years, open to trial of lower estrogen dosing with OCPs. Desiring vaginal STD testing today. Blood work with PCP yesterday. Denies hot flashes, vaginal dryness or menstrual irregularity. Pt also needing repeat cotesting today, d/t prior pap NILM HPV+ 8/2017 (HR types 16 and 18 negative, result in [facility]). Remote h/o high-grade dysplasia requiring LEEP in 2004, but subsequent paps have all been normal, she had returned to routine screening interval.    PMH of thyroid dz and depression, otherwise healthy. Ob/Gyn Hx:  G0  LMP: 1/1/19  Menses- regular monthly cycles x3-4 days, moderate flow, +cramping  Contraception-standard dose OCP  STI- HPV+  ? SA-yes    Health maintenance:  Pap-12/19/18 NILM HPV Neg, 8/1/17, NILM HPV+, h/o LEEP in 2004 for high-grade dysplasia  Mammo-8/28/18 B1, plans to schedule  Colonoscopy- Denies, given referral by PCP yesterday    Past Medical History:   Diagnosis Date    Abnormal Papanicolaou smear of cervix     Depression with anxiety 1995    h/o psychiatric hosp 2013 ([place])    Family history of skin cancer     History of migraine     preivoulsy required Botox    Sun-damaged skin     childhood and young adult    Sunburn, blistering     childhood    Tanning bed exposure     Unspecified hypothyroidism        Past Surgical History:   Procedure Laterality Date    HX BREAST AUGMENTATION  2006    bilateral    HX LEEP PROCEDURE  2004    HX UROLOGICAL      urethral dilation 11years of age   [facility] IMPLANT BREAST SILICONE/EQ Bilateral     [address] DUSTIN MAMMOGRAM DIGITAL BILATERAL  5/13       Family History   Problem Relation Age of Onset    Hypertension Father     Cancer Father         AML    Arthritis-osteo Mother         TKR    Hypertension Mother     Parkinson's Disease Mother     Bleeding Prob Maternal Aunt 70    Breast Cancer Maternal Aunt         46s    Cancer Maternal Grandmother         stomach    Breast Cancer Cousin 39       Social History     Socioeconomic History    Marital status:      Spouse name: .  Number of children: 0    Years of education: college    Highest education level: Not on file   Occupational History    Occupation: [employer]      Employer: [employer] Financial resource strain: Not on file    Food insecurity:     Worry: Not on file     Inability: Not on file    Transportation needs:     Medical: Not on file     Non-medical: Not on file   Tobacco Use    Smoking status: Never Smoker    Smokeless tobacco: Never Used   Substance and Sexual Activity    Alcohol use:  Yes     Alcohol/week: 1.0 standard drinks     Types: 1 Standard drinks or equivalent per week     Frequency: 2-4 times a month     Drinks per session: 1 or 2     Binge frequency: Never     Comment: 1 drink most weeks    Drug use: No    Sexual activity: Yes     Partners: Male     Birth control/protection: Pill   Lifestyle    Physical activity:     Days per week: Not on file     Minutes per session: Not on file    Stress: Not on file   Relationships    Social connections:     Talks on phone: Not on file     Gets together: Not on file     Attends Anabaptist service: Not on file     Active member of club or organization: Not on file     Attends meetings of clubs or organizations: Not on file     Relationship status: Not on file    Intimate partner violence:     Fear of current or ex partner: Not on file     Emotionally abused: Not on file     Physically abused: Not on file     Forced sexual activity: Not on file   Other Topics Concern     Service Not Asked    Blood Transfusions Not Asked    Caffeine Concern Not Asked    Occupational Exposure Not Asked   Cee [employer] Hazards Not Asked    Sleep Concern Not Asked    Stress Concern Not Asked  Weight Concern Not Asked    Special Diet Not Asked    Back Care Not Asked    Exercise No     Comment: not exercising     Bike Helmet Not Asked    Seat Belt Not Asked    Self-Exams Not Asked   Social History Narrative    Not on file       Current Outpatient Medications   Medication Sig Dispense Refill    buPROPion XL (WELLBUTRIN XL) 300 mg XL tablet Take 1 Tab by mouth every morning. 90 Tab 1    escitalopram oxalate (LEXAPRO) 20 mg tablet Take 1 Tab by mouth daily. 90 Tab 1    levonorgestrel-ethinyl estradiol (MYZILRA) 50-30 (6)/75-40 (5)/125-30(10) tab Take 1 Tab by mouth daily. 3 Package 3    levothyroxine (SYNTHROID) 150 mcg tablet Take 1 Tab by mouth Daily (before breakfast). 90 Tab 3    cholecalciferol (VITAMIN D3) 1,000 unit tablet Take  by mouth daily.  cyanocobalamin (VITAMIN B-12) 1,000 mcg sublingual tablet Take 1,000 mcg by mouth daily.  Biotin 2,500 mcg cap Take  by mouth.          Allergies   Allergen Reactions    Pcn [Penicillins] Rash       Review of Systems - History obtained from the patient  Constitutional: negative for weight loss, fever, night sweats  HEENT: negative for hearing loss, earache, congestion, snoring, sorethroat  CV: negative for chest pain, palpitations, edema  Resp: negative for cough, shortness of breath, wheezing  GI: negative for change in bowel habits, abdominal pain, black or bloody stools  : negative for frequency, dysuria, hematuria, vaginal discharge  MSK: negative for back pain, joint pain, muscle pain  Breast: negative for breast lumps, nipple discharge, galactorrhea  Skin :negative for itching, rash, hives  Neuro: negative for dizziness, headache, confusion, weakness  Psych: negative for anxiety, depression, change in mood  Heme/lymph: negative for bleeding, bruising, pallor    Physical Exam  Visit Vitals  /84 (BP 1 Location: Left arm, BP Patient Position: Sitting)   Ht 5' 4\" (1.626 m)   Wt 170 lb (77.1 kg)   LMP 01/01/2020 (Approximate) BMI 29.18 kg/m²     Constitutional  · Appearance: well-nourished, well developed, alert, in no acute distress    HENT  · Head and Face: appears normal    Chest  · Respiratory Effort: non-labored breathing, CTAB    Cardiovascular   Heart: Reg rate and rhythm  · Extremities: no peripheral edema    Breast exam: bilateral breast augmentation, otherwise unremarkable exam, normal appearance, symmetric, no lumps or masses, no skin changes, no nipple inversion or d/c, etc    Gastrointestinal  · Abdominal Examination: abdomen non-tender to palpation, normal bowel sounds, no masses present  · Liver and spleen: no hepatomegaly present, spleen not palpable  · Hernias: no hernias identified    Genitourinary  · External Genitalia: normal appearance for age, no discharge present, no tenderness present, no inflammatory lesions present, no masses present, no atrophy present  · Vagina: normal vaginal vault without central or paravaginal defects, no discharge present, no inflammatory lesions present, no masses present  · Bladder: non-tender to palpation  · Urethra: appears normal  · Cervix: normal, no cervicitis, no lesions, no CMT, +bleeding after pap   · Uterus: normal size, shape and consistency  · Adnexa: no adnexal tenderness present, no adnexal masses present  · Perineum: perineum within normal limits, no evidence of trauma, no rashes or skin lesions present    Skin  · General Inspection: no rash, no lesions identified    Neurologic/Psychiatric  · Mental Status:  · Orientation: grossly oriented to person, place and time  · Mood and Affect: mood normal, affect appropriate      Assessment/Plan:  48 y.o. G0 presenting for annual exam.    Annual exam:  -diet/exercies/lifestyle counseling  -pap/HPV today  -Vaginal STI screening  -mammo to be scheduled (has referral from PCP)  -colonoscopy to be scheduled (referred by PCP)  -trial of Junel OCPs (low-dose), previously on standard dose    RTC: 1 year for AE or sooner prn    Lou Sutherland Haley Russell MD  1/9/2020  8:30 AM

## 2020-01-09 NOTE — PATIENT INSTRUCTIONS

## 2020-01-13 LAB
C TRACH RRNA CVX QL NAA+PROBE: NEGATIVE
CYTOLOGIST CVX/VAG CYTO: NORMAL
CYTOLOGY CVX/VAG DOC CYTO: NORMAL
CYTOLOGY CVX/VAG DOC THIN PREP: NORMAL
DX ICD CODE: NORMAL
HPV I/H RISK 4 DNA CVX QL PROBE+SIG AMP: NEGATIVE
Lab: NORMAL
N GONORRHOEA RRNA CVX QL NAA+PROBE: NEGATIVE
OTHER STN SPEC: NORMAL
STAT OF ADQ CVX/VAG CYTO-IMP: NORMAL
T VAGINALIS RRNA SPEC QL NAA+PROBE: NEGATIVE

## 2020-01-20 ENCOUNTER — HOSPITAL ENCOUNTER (OUTPATIENT)
Dept: MAMMOGRAPHY | Age: 50
Discharge: HOME | End: 2020-01-20
Attending: NURSE PRACTITIONER
Payer: COMMERCIAL

## 2020-01-20 DIAGNOSIS — Z12.39 BREAST CANCER SCREENING: ICD-10-CM

## 2020-01-20 PROCEDURE — 77067 SCR MAMMO BI INCL CAD: CPT

## 2020-01-21 NOTE — PROGRESS NOTES
Lopez Real,     Your mammogram was negative. No mammographic evidence of malignancy.     RECOMMENDATIONS:  Next screening mammogram is recommended in one year.      Please let me know if you have any additional questions.     Alex Suarez NP

## 2020-01-23 DIAGNOSIS — E03.9 ACQUIRED HYPOTHYROIDISM: Primary | ICD-10-CM

## 2020-01-23 NOTE — PROGRESS NOTES
Hi Addie,     Your TSH is slightly low and your T4 is at goal. Please return for a lab only visit in 6-8 weeks to repeat your TSH prior to us making any changes to your regimen. Your cholesterol looks great. Your screening for diabetes is negative. Your screening for hepatitis B&C, HIV, and syphilis are negative. The rest of your labs look great. Please let me know if you have any additional questions.     Christiano Garcia NP

## 2020-03-20 DIAGNOSIS — E03.9 ACQUIRED HYPOTHYROIDISM: Primary | ICD-10-CM

## 2020-03-20 RX ORDER — LEVOTHYROXINE SODIUM 150 UG/1
150 TABLET ORAL
Qty: 90 TAB | Refills: 3 | OUTPATIENT
Start: 2020-03-20

## 2020-03-20 RX ORDER — LEVOTHYROXINE SODIUM 150 UG/1
150 TABLET ORAL
Qty: 60 TAB | Refills: 0 | Status: SHIPPED | OUTPATIENT
Start: 2020-03-20 | End: 2020-05-22

## 2020-05-24 DIAGNOSIS — F32.5 MAJOR DEPRESSION IN COMPLETE REMISSION (HCC): ICD-10-CM

## 2020-05-24 DIAGNOSIS — F41.1 GENERALIZED ANXIETY DISORDER: ICD-10-CM

## 2020-05-26 RX ORDER — ESCITALOPRAM OXALATE 20 MG/1
TABLET ORAL
Qty: 90 TAB | Refills: 1 | Status: SHIPPED | OUTPATIENT
Start: 2020-05-26 | End: 2020-06-19

## 2020-05-26 RX ORDER — BUPROPION HYDROCHLORIDE 300 MG/1
TABLET ORAL
Qty: 90 TAB | Refills: 1 | Status: SHIPPED | OUTPATIENT
Start: 2020-05-26 | End: 2020-06-19

## 2020-05-28 LAB
T4 FREE SERPL-MCNC: 1.3 NG/DL (ref 0.82–1.77)
TSH SERPL DL<=0.005 MIU/L-ACNC: 0.3 UIU/ML (ref 0.45–4.5)

## 2020-05-29 DIAGNOSIS — E03.9 ACQUIRED HYPOTHYROIDISM: Primary | ICD-10-CM

## 2020-05-29 RX ORDER — LEVOTHYROXINE SODIUM 137 UG/1
137 TABLET ORAL
Qty: 60 TAB | Refills: 0 | Status: SHIPPED | OUTPATIENT
Start: 2020-05-29 | End: 2020-08-05 | Stop reason: SDUPTHER

## 2020-05-29 NOTE — PROGRESS NOTES
Hi Addie,     Your TSH remains low indicating that we need to make a small dose reduction to your levothyroxine. I recommend taking levothyroxine 137 mcg daily. I have sent this refill to your pharmacy. We will need to repeat your labs in 6-8 weeks. Please call to schedule this lab visit for this time. Please let me know if you have any additional questions.     Isabella Estrada NP

## 2020-06-19 ENCOUNTER — VIRTUAL VISIT (OUTPATIENT)
Dept: BEHAVIORAL/MENTAL HEALTH CLINIC | Age: 50
End: 2020-06-19

## 2020-06-19 DIAGNOSIS — F32.5 MAJOR DEPRESSION IN COMPLETE REMISSION (HCC): ICD-10-CM

## 2020-06-19 DIAGNOSIS — F41.1 GENERALIZED ANXIETY DISORDER: ICD-10-CM

## 2020-06-19 RX ORDER — BUPROPION HYDROCHLORIDE 300 MG/1
TABLET ORAL
Qty: 90 TAB | Refills: 3 | Status: SHIPPED | OUTPATIENT
Start: 2020-06-19 | End: 2022-01-28 | Stop reason: SDUPTHER

## 2020-06-19 RX ORDER — ESCITALOPRAM OXALATE 20 MG/1
TABLET ORAL
Qty: 90 TAB | Refills: 3 | Status: SHIPPED | OUTPATIENT
Start: 2020-06-19 | End: 2022-01-28 | Stop reason: SDUPTHER

## 2020-06-19 NOTE — PROGRESS NOTES
Jeanie Grace is a 52 y.o. female and was seen today for follow-up of psychiatric condition and psychotropic medication management. Daisy Mckeon is a 53 y/o recently  female who had been struggling with depression and anxiety since age [age]. However she did not seek any interventions until her 25s when she attempted suicide by overdose but was not hospitalized. She was  to a much older man who was quite critical and avoided intimacy which precipitated a major depressive episode where she attempted an overdose and was hospitalized at [facility] back in Feb. 25, 2013. She met Dr. Mena Baltazar and since has been her patient until December 29,2018. She reports relative stability with the current medications, Wellbutrin 300mg XL and Lexapro 20mg. She does not take the Ambien.       Current symptoms: several days of feeling tired, depressed, and feeling bad about self,racing thoughts,minimal anxiety,tension,fears,teeth grinding,  Duration of symptoms:        PHQ-9 scores:3/27  HAM-A scores:11/56  Mood Disorder Questionaire scores:1/13     STRESSORS:  finances     PERSONAL COPING STYLEs :  Problem solve            REVIEW OF  PSYCHIATRIC SYSTEMS:  Patient did not meet criteria for a PTSD, Schizophrenia, Bipolar Affective Disorder, Obsessive Compulsive Disorder, , Agoraphobia, EATING DISORDER,SUBSTANCE USE DISORDER,  Psychotic disorders or ASD.        PAST PSYCHIATRIC HISTORY:   Outpatient Psychiatric treatments:,  Suicide attempts/self inflictive behaviors:2 attempts by overdose  Hospitalizations:  One in Feb 2013 for suicide attempt, [facility]   Medications Tried:  VIibryd, Celexa, Prozac,  ECT:   none  Legal/Assault History:  none     PAST SUBSTANCE ABUSE HISTORY:  Unremarkable     FAMILY PSYCH HISTORY: Unremarkable        SOCIAL HISTORY: Only child born to an intact marriage, attended [employer] and obtained her BA in Business administration and has been working as a Branch Liaison for [employer]. She was  for almost 12 years,  from her  and recently . She has no children but has long distance relationship with a friend at the [employer]. Her mother is very supportive, father  a few years ago. NO history of physical or sexual abuse.      PAST MEDICAL/SURGICAL HISTORY:  Hypothyroidism      Jovita Lemons is a 48 y.o. female who was seen by synchronous (real-time) audio-video technology on 2020. Consent: Jovita Lemons, who was seen by synchronous (real-time) audio-video technology, and/or her healthcare decision maker, is aware that this patient-initiated, Telehealth encounter on 2020 is a billable service, with coverage as determined by her insurance carrier. She is aware that she may receive a bill and has provided verbal consent to proceed: Yes. Assessment & Plan:   Diagnoses and all orders for this visit:    1. Major depression in complete remission (HCC)  -     buPROPion XL (WELLBUTRIN XL) 300 mg XL tablet; TAKE 1 TABLET EVERY MORNING  -     escitalopram oxalate (LEXAPRO) 20 mg tablet; TAKE 1 TABLET DAILY    2. Generalized anxiety disorder  -     buPROPion XL (WELLBUTRIN XL) 300 mg XL tablet; TAKE 1 TABLET EVERY MORNING  -     escitalopram oxalate (LEXAPRO) 20 mg tablet; TAKE 1 TABLET DAILY        I spent at least 12  minutes on this visit with this established patient. Subjective:   Jovita Lemons is a 48 y.o. female who was seen for Follow-up    She was last seen 6 months ago. She continues to remain stable and make progress but worried about her mother who lives alone with Parkinson's disease 6 hours away. She refuses to move to [place] which is what Ramy Martini would like. She has been inundated with work but likes her boss. She is otherwise healthy, frustrated for not being able to attend her \" [employer]\" classes. Prior to Admission medications    Medication Sig Start Date End Date Taking?  Authorizing Provider   buPROPion XL (WELLBUTRIN XL) 300 mg XL tablet TAKE 1 TABLET EVERY MORNING 6/19/20  Yes Sultana LOUISE Townsend MD   escitalopram oxalate (LEXAPRO) 20 mg tablet TAKE 1 TABLET DAILY 6/19/20  Yes Sultana LOUISE Townsend MD   levothyroxine (SYNTHROID) 137 mcg tablet Take 137 mcg by mouth Daily (before breakfast). 5/29/20   Luis Lewis NP   escitalopram oxalate (LEXAPRO) 20 mg tablet TAKE 1 TABLET DAILY 5/26/20 6/19/20  Willow GIL MD   buPROPion XL (WELLBUTRIN XL) 300 mg XL tablet TAKE 1 TABLET EVERY MORNING 5/26/20 6/19/20  Dariela Simpson MD   cholecalciferol (VITAMIN D3) 1,000 unit tablet Take  by mouth daily. Provider, Historical   cyanocobalamin (VITAMIN B-12) 1,000 mcg sublingual tablet Take 1,000 mcg by mouth daily. Provider, Historical   Biotin 2,500 mcg cap Take  by mouth. Provider, Historical     Allergies   Allergen Reactions    Pcn [Penicillins] Rash       ROS: unremarkable    Objective:   Vital Signs: (As obtained by patient/caregiver at home)  There were no vitals taken for this visit. MSE: Ms. [employer] was well groomed, calm and collective. She was cooperative, smiling. No thought or speech disorder noted. NO perceptual distortions noted. NO delusional thinking process. She denied any SI/HI.     [INSTRUCTIONS:  \"[x]\" Indicates a positive item  \"[]\" Indicates a negative item  -- DELETE ALL ITEMS NOT EXAMINED]    Constitutional: [x] Appears well-developed and well-nourished [x] No apparent distress      [] Abnormal -     Mental status: [x] Alert and awake  [x] Oriented to person/place/time [x] Able to follow commands    [] Abnormal -     Eyes:   EOM    [x]  Normal    [] Abnormal -   Sclera  [x]  Normal    [] Abnormal -          Discharge [x]  None visible   [] Abnormal -     HENT: [x] Normocephalic, atraumatic  [] Abnormal -       External Ears [x] Normal  [] Abnormal -    Neck: [x] No visualized mass [] Abnormal -     Pulmonary/Chest: [x] Respiratory effort normal   [x] No visualized signs of difficulty breathing or respiratory distress        [] Abnormal -      Musculoskeletal:           [x] Normal range of motion of neck        [] Abnormal -     Neurological:        [x] No Facial Asymmetry (Cranial nerve 7 motor function) (limited exam due to video visit)          [x] No gaze palsy        [] Abnormal -          Skin:        [x] No significant exanthematous lesions or discoloration noted on facial skin         [] Abnormal -            Psychiatric:       [x] Normal Affect [] Abnormal -        [x] No Hallucinations    Other pertinent observable physical exam findings:-        We discussed the expected course, resolution and complications of the diagnosis(es) in detail. Medication risks, benefits, costs, interactions, and alternatives were discussed as indicated. I advised her to contact the office if her condition worsens, changes or fails to improve as anticipated. She expressed understanding with the diagnosis(es) and plan. Ganga Humphrey is a 48 y.o. female who was evaluated by a video visit encounter for concerns as above. Patient identification was verified prior to start of the visit. A caregiver was present when appropriate. Due to this being a TeleHealth encounter (During NWTOY-80 public health emergency), evaluation of the following organ systems was limited: Vitals/Constitutional/EENT/Resp/CV/GI//MS/Neuro/Skin/Heme-Lymph-Imm. Pursuant to the emergency declaration under the [facility] waiver authority and the [employer] and [employer] Act, this Virtual  Visit was conducted, with patient's (and/or legal guardian's) consent, to reduce the patient's risk of exposure to COVID-19 and provide necessary medical care. Services were provided through a video synchronous discussion virtually to substitute for in-person clinic visit. Patient and provider were located at their individual homes.       Elana Montero MD

## 2020-08-04 ENCOUNTER — PATIENT MESSAGE (OUTPATIENT)
Dept: FAMILY MEDICINE CLINIC | Age: 50
End: 2020-08-04

## 2020-08-04 DIAGNOSIS — E03.9 ACQUIRED HYPOTHYROIDISM: ICD-10-CM

## 2020-08-05 RX ORDER — LEVOTHYROXINE SODIUM 137 UG/1
137 TABLET ORAL
Qty: 15 TAB | Refills: 0 | Status: SHIPPED | OUTPATIENT
Start: 2020-08-05 | End: 2020-08-20 | Stop reason: SDUPTHER

## 2020-08-05 NOTE — TELEPHONE ENCOUNTER
From: Alfredito Swain  To: Nu Jones NP  Sent: 8/4/2020 3:38 PM EDT  Subject: Test Results Question    Hello, I believe it's time for me to come back to have my thyroid levels checked again. I only have another 10 days or so of the .137 synthroid left.

## 2020-08-19 ENCOUNTER — APPOINTMENT (OUTPATIENT)
Dept: FAMILY MEDICINE CLINIC | Age: 50
End: 2020-08-19

## 2020-08-20 DIAGNOSIS — E03.9 ACQUIRED HYPOTHYROIDISM: ICD-10-CM

## 2020-08-20 LAB
T4 FREE SERPL-MCNC: 1.17 NG/DL (ref 0.82–1.77)
TSH SERPL DL<=0.005 MIU/L-ACNC: 1.8 UIU/ML (ref 0.45–4.5)

## 2020-08-20 RX ORDER — LEVOTHYROXINE SODIUM 137 UG/1
137 TABLET ORAL
Qty: 90 TAB | Refills: 1 | Status: SHIPPED | OUTPATIENT
Start: 2020-08-20 | End: 2021-04-06 | Stop reason: SDUPTHER

## 2020-08-20 NOTE — PROGRESS NOTES
Mahi Masterson labs look great. We will continue your dose. I will send in additional refills for you. I will plan to see you back for your annual exam in January. Please let me know if you have any additional questions.      Ravin Dunne NP

## 2020-12-05 ENCOUNTER — OFFICE VISIT (OUTPATIENT)
Dept: URGENT CARE | Age: 50
End: 2020-12-05
Payer: COMMERCIAL

## 2020-12-05 VITALS — RESPIRATION RATE: 16 BRPM | OXYGEN SATURATION: 97 % | TEMPERATURE: 98.4 F | HEART RATE: 93 BPM

## 2020-12-05 DIAGNOSIS — Z20.822 SUSPECTED COVID-19 VIRUS INFECTION: Primary | ICD-10-CM

## 2020-12-05 PROCEDURE — 99202 OFFICE O/P NEW SF 15 MIN: CPT | Performed by: FAMILY MEDICINE

## 2020-12-05 NOTE — PROGRESS NOTES
This patient was seen at [address] [place] [address] [facility]gent Care while in their vehicle due to COVID-19 pandemic with PPE and focused examination in order to decrease community viral transmission. The patient/guardian gave verbal consent to treat. The history is provided by the patient. Cough   The history is provided by the patient. This is a new problem. The current episode started 2 days ago. The problem occurs every few minutes. The problem has not changed since onset. The cough is non-productive. There has been no fever. Associated symptoms include nausea. Pertinent negatives include no chills, no sweats, no shortness of breath, no wheezing and no vomiting. She has tried nothing for the symptoms. Risk factors: travel to [facility] and was around peoples withoust PPE and social diatancing  She is not a smoker. Her past medical history does not include pneumonia or asthma.         Past Medical History:   Diagnosis Date    Abnormal Papanicolaou smear of cervix     Depression with anxiety 1995    h/o psychiatric hosp 2013 ()    Family history of skin cancer     History of migraine     preivoulsy required Botox    Sun-damaged skin     childhood and young adult    Sunburn, blistering     childhood    Tanning bed exposure     Unspecified hypothyroidism         Past Surgical History:   Procedure Laterality Date    HX BREAST AUGMENTATION  2006    bilateral    HX LEEP PROCEDURE  2004    HX UROLOGICAL      urethral dilation 11years of age   Td IMPLANT BREAST SILICONE/EQ Bilateral     2006 [place]    DUSTIN MAMMOGRAM DIGITAL BILATERAL  5/13         Family History   Problem Relation Age of Onset    Hypertension Father     Cancer Father         AML    Arthritis-osteo Mother         TKR    Hypertension Mother     Parkinson's Disease Mother     Bleeding Prob Maternal Aunt 70    Breast Cancer Maternal Aunt         46s    Cancer Maternal Grandmother         stomach    Breast Cancer Cousin 39        Social History Socioeconomic History    Marital status:      Spouse name: .  Number of children: 0    Years of education: college    Highest education level: Not on file   Occupational History    Occupation: [employer]      Employer: [employer]; [employer] Financial resource strain: Not on file    Food insecurity     Worry: Not on file     Inability: Not on file   [employer] needs     Medical: Not on file     Non-medical: Not on file   Tobacco Use    Smoking status: Never Smoker    Smokeless tobacco: Never Used   Substance and Sexual Activity    Alcohol use:  Yes     Alcohol/week: 1.0 standard drinks     Types: 1 Standard drinks or equivalent per week     Frequency: 2-4 times a month     Drinks per session: 1 or 2     Binge frequency: Never     Comment: 1 drink most weeks    Drug use: No    Sexual activity: Yes     Partners: Male     Birth control/protection: Pill   Lifestyle    Physical activity     Days per week: Not on file     Minutes per session: Not on file    Stress: Not on file   Relationships    Social connections     Talks on phone: Not on file     Gets together: Not on file     Attends [employer] service: Not on file     Active member of club or organization: Not on file     Attends meetings of clubs or organizations: Not on file     Relationship status: Not on file    Intimate partner violence     Fear of current or ex partner: Not on file     Emotionally abused: Not on file     Physically abused: Not on file     Forced sexual activity: Not on file   Other Topics Concern     Service Not Asked    Blood Transfusions Not Asked    Caffeine Concern Not Asked    Occupational Exposure Not Asked   Sita Even Hazards Not Asked    Sleep Concern Not Asked    Stress Concern Not Asked    Weight Concern Not Asked    Special Diet Not Asked    Back Care Not Asked    Exercise No     Comment: not exercising     Bike Helmet Not Asked   [address] Not Asked    Self-Exams Not Asked   Social History Narrative    Not on file                ALLERGIES: Pcn [penicillins]    Review of Systems   Constitutional: Negative for chills. Respiratory: Positive for cough. Negative for shortness of breath and wheezing. Gastrointestinal: Positive for nausea. Negative for vomiting. All other systems reviewed and are negative. Vitals:    12/05/20 0900   Pulse: 93   Resp: 16   Temp: 98.4 °F (36.9 °C)   SpO2: 97%       Physical Exam  Vitals signs and nursing note reviewed. Constitutional:       General: She is not in acute distress. Appearance: She is not ill-appearing. Pulmonary:      Effort: Pulmonary effort is normal. No respiratory distress. Breath sounds: No wheezing. MDM    Procedures        ICD-10-CM ICD-9-CM    1. Suspected COVID-19 virus infection  Z20.828 V01.79 NOVEL CORONAVIRUS (COVID-19)        Tested for Covid-19 and advised to quarantine x 2 weeks    No orders of the defined types were placed in this encounter. No results found for any visits on 12/05/20. The patients condition was discussed with the patient and they understand. The patient is to follow up with primary care doctor. If signs and symptoms become worse the pt is to go to the ER. The patient is to take medications as prescribed.

## 2020-12-08 LAB — SARS-COV-2, NAA: NOT DETECTED

## 2021-01-11 ENCOUNTER — NURSE TRIAGE (OUTPATIENT)
Dept: OTHER | Facility: CLINIC | Age: 51
End: 2021-01-11

## 2021-01-11 NOTE — TELEPHONE ENCOUNTER
Reason for Call: Annita Frankel states that she has had a cough for the last 5-6 weeks and would like to be seen. Symptoms: Cough    Disposition: See in office within 3 days  Advised patient to call nurse triage back with any new or worsening symptoms. Patient verbalizes understanding. Reason for Disposition   Cough has been present for > 3 weeks    Answer Assessment - Initial Assessment Questions  1. ONSET: \"When did the cough begin? \"       6 weeks    2. SEVERITY: \"How bad is the cough today? \"       Not bad but more of a nuisance cough    3. RESPIRATORY DISTRESS: \"Describe your breathing. \"       No    4. FEVER: \"Do you have a fever? \" If so, ask: \"What is your temperature, how was it measured, and when did it start? \"      No    5. HEMOPTYSIS: \"Are you coughing up any blood? \" If so ask: \"How much? \" (flecks, streaks, tablespoons, etc.)      No    6. TREATMENT: \"What have you done so far to treat the cough? \" (e.g., meds, fluids, humidifier)      Nothing    7. CARDIAC HISTORY: \"Do you have any history of heart disease? \" (e.g., heart attack, congestive heart failure)       No    8. LUNG HISTORY: \"Do you have any history of lung disease? \"  (e.g., pulmonary embolus, asthma, emphysema)      No    9. PE RISK FACTORS: \"Do you have a history of blood clots? \" (or: recent major surgery, recent prolonged travel, bedridden)      No    10. OTHER SYMPTOMS: \"Do you have any other symptoms? (e.g., runny nose, wheezing, chest pain)        Fayigue    11. PREGNANCY: \"Is there any chance you are pregnant? \" \"When was your last menstrual period? \"        No    12. TRAVEL: \"Have you traveled out of the country in the last month? \" (e.g., travel history, exposures)        No    Protocols used: [id number]-XFRVF-[id number]

## 2021-01-13 ENCOUNTER — VIRTUAL VISIT (OUTPATIENT)
Dept: FAMILY MEDICINE CLINIC | Age: 51
End: 2021-01-13
Payer: COMMERCIAL

## 2021-01-13 DIAGNOSIS — R05.9 COUGH: Primary | ICD-10-CM

## 2021-01-13 DIAGNOSIS — J02.9 SORE THROAT: ICD-10-CM

## 2021-01-13 PROCEDURE — 99213 OFFICE O/P EST LOW 20 MIN: CPT | Performed by: NURSE PRACTITIONER

## 2021-01-13 RX ORDER — BENZONATATE 100 MG/1
100 CAPSULE ORAL
Qty: 30 CAP | Refills: 0 | Status: SHIPPED | OUTPATIENT
Start: 2021-01-13 | End: 2021-01-23

## 2021-01-13 RX ORDER — AZITHROMYCIN 250 MG/1
TABLET, FILM COATED ORAL
Qty: 6 TAB | Refills: 0 | Status: SHIPPED | OUTPATIENT
Start: 2021-01-13 | End: 2021-01-18

## 2021-01-13 NOTE — PROGRESS NOTES
Alphonse Nguyen  46 y.o. female  1970  [address] [place] [address] Street [address]  [id number]     [facility]       Encounter Date: 1/13/2021           Established Patient Visit Note: Breanna Mcmanus NP    Reason for Appointment:  Chief Complaint   Patient presents with    Cough     dry     Sore Throat     irritation        History of Present Illness:  History provided by patient    Alphonse Nguyen is a 46 y.o. female who presents today for dry cough 2 weeks. It is associated with throat irritation. She travelled to [place] ,but her Covid test was negative. She is not taking any medication for cough. Denies chills, fever, fatigue head, or chest congestion. Denies Asthma, and allergies. Review of Systems  Review of Systems   Constitutional: Negative. HENT: Positive for sore throat. Respiratory: Positive for cough. Cardiovascular: Negative. Gastrointestinal: Negative. Allergies: Pcn [penicillins]    Medications: (Updated to reflect final medication list after visit)    Current Outpatient Medications:     azithromycin (ZITHROMAX) 250 mg tablet, Take 2 tablets today, then take 1 tablet daily, Disp: 6 Tab, Rfl: 0    benzonatate (TESSALON) 100 mg capsule, Take 1 Cap by mouth three (3) times daily as needed for Cough for up to 10 days. , Disp: 30 Cap, Rfl: 0    levothyroxine (SYNTHROID) 137 mcg tablet, Take 137 mcg by mouth Daily (before breakfast). , Disp: 90 Tab, Rfl: 1    buPROPion XL (WELLBUTRIN XL) 300 mg XL tablet, TAKE 1 TABLET EVERY MORNING, Disp: 90 Tab, Rfl: 3    escitalopram oxalate (LEXAPRO) 20 mg tablet, TAKE 1 TABLET DAILY, Disp: 90 Tab, Rfl: 3    cholecalciferol (VITAMIN D3) 1,000 unit tablet, Take  by mouth daily. , Disp: , Rfl:     cyanocobalamin (VITAMIN B-12) 1,000 mcg sublingual tablet, Take 1,000 mcg by mouth daily. , Disp: , Rfl:     Biotin 2,500 mcg cap, Take  by mouth., Disp: , Rfl:     History  Patient Care Team:  Asuncion Siemens, NP as PCP - General (Nurse Practitioner)  Brionna Sawyer NP as PCP - [facility] Empaneled Provider  Cuauhtemoc Jacobs MD (Psychiatry)  Mary Shea MD (Endocrinology)  Anila Cali MD (Obstetrics & Gynecology)    Past Medical History: she has a past medical history of Abnormal Papanicolaou smear of cervix, Depression with anxiety (1995), Family history of skin cancer, History of migraine, Sun-damaged skin, Sunburn, blistering, Tanning bed exposure, and Unspecified hypothyroidism. She also has no past medical history of Arsenic suspected exposure, Radiation exposure, Skin cancer, or Skin disorder. Past Surgical History: she has a past surgical history that includes hx leep procedure (2004); abimbola mammogram digital bilateral (5/13); hx breast augmentation (2006); hx urological; and implant breast silicone/eq (Bilateral). Family Medical History: family history includes Arthritis-osteo in her mother; Bleeding Prob (age of onset: 70) in her maternal aunt; Breast Cancer in her maternal aunt; Breast Cancer (age of onset: 39) in her cousin; Cancer in her father and maternal grandmother; Hypertension in her father and mother; Parkinson's Disease in her mother. Social History: she reports that she has never smoked. She has never used smokeless tobacco. She reports current alcohol use of about 1.0 standard drinks of alcohol per week. She reports that she does not use drugs. No specialty comments available. Objective:   Vital Signs  Unable to obtain vital signs today as patient does not have equipment for this at home    Physical Exam    Assessment & Plan:    1. Cough    - benzonatate (TESSALON) 100 mg capsule; Take 1 Cap by mouth three (3) times daily as needed for Cough for up to 10 days. Dispense: 30 Cap; Refill: 0    2. Sore throat    - azithromycin (ZITHROMAX) 250 mg tablet; Take 2 tablets today, then take 1 tablet daily  Dispense: 6 Tab;  Refill: 0          I was in the office while conducting this encounter. Consent:  She and/or her healthcare decision maker is aware that this patient-initiated Telehealth encounter is a billable service, with coverage as determined by her insurance carrier. She is aware that she may receive a bill and has provided verbal consent to proceed: Yes    This virtual visit was conducted via [employer]. Pursuant to the emergency declaration under the [address] [place] [address] waiver authority and the "[employer] and [employer] Act, this Virtual  Visit was conducted to reduce the patient's risk of exposure to COVID-19 and provide continuity of care for an established patient. Services were provided through a video synchronous discussion virtually to substitute for in-person clinic visit. Due to this being a TeleHealth evaluation, many elements of the physical examination are unable to be assessed. Total Time: minutes: 11-20 minutes. I have discussed the diagnosis with the patient and the intended plan as seen in the above orders. The patient has received an after-visit summary along with patient information handout. I have discussed medication side effects and warnings with the patient as well.     Disposition        Mary Lou Nam NP

## 2021-01-15 ENCOUNTER — TRANSCRIBE ORDER (OUTPATIENT)
Dept: SCHEDULING | Age: 51
End: 2021-01-15

## 2021-01-15 DIAGNOSIS — Z12.31 SCREENING MAMMOGRAM FOR HIGH-RISK PATIENT: Primary | ICD-10-CM

## 2021-01-29 ENCOUNTER — HOSPITAL ENCOUNTER (OUTPATIENT)
Dept: MAMMOGRAPHY | Age: 51
Discharge: HOME | End: 2021-01-29
Attending: NURSE PRACTITIONER
Payer: COMMERCIAL

## 2021-01-29 DIAGNOSIS — Z12.31 SCREENING MAMMOGRAM FOR HIGH-RISK PATIENT: ICD-10-CM

## 2021-01-29 PROCEDURE — 77067 SCR MAMMO BI INCL CAD: CPT

## 2021-03-17 ENCOUNTER — OFFICE VISIT (OUTPATIENT)
Dept: OBGYN CLINIC | Age: 51
End: 2021-03-17
Payer: COMMERCIAL

## 2021-03-17 VITALS — BODY MASS INDEX: 28.15 KG/M2 | WEIGHT: 164 LBS | SYSTOLIC BLOOD PRESSURE: 117 MMHG | DIASTOLIC BLOOD PRESSURE: 60 MMHG

## 2021-03-17 DIAGNOSIS — Z01.419 ENCOUNTER FOR GYNECOLOGICAL EXAMINATION WITHOUT ABNORMAL FINDING: Primary | ICD-10-CM

## 2021-03-17 PROCEDURE — 99396 PREV VISIT EST AGE 40-64: CPT | Performed by: OBSTETRICS & GYNECOLOGY

## 2021-03-17 RX ORDER — NORETHINDRONE ACETATE AND ETHINYL ESTRADIOL 1MG-20(21)
1 KIT ORAL DAILY
Qty: 3 DOSE PACK | Refills: 4 | Status: SHIPPED | OUTPATIENT
Start: 2021-03-17 | End: 2021-04-14

## 2021-03-17 NOTE — PROGRESS NOTES
Annual Visit    Rafy Parkinson is a 46 y.o. G0 presenting for annual visit. Doing well. No concerns today. Stopped Junel OCPs in December as she wanted to see if she was menopausal. However, she has had regular monthly menses for the past 3 months since stopping OCPs. Would like to resume pills. When on OCPs, menses are extremely short and light, with just mild cramping. She is sexually active. Desiring vaginal STD testing today. Plans blood work at upcoming PCP visit. Denies hot flashes, vaginal dryness or menstrual irregularity. PMH of thyroid dz, migraines, and depression, otherwise healthy. Ob/Gyn Hx:  G0  LMP: 1/1/19  Menses- regular monthly cycles x3-4 days, moderate flow, +cramping  Contraception-Junel OCP  STI- HPV+  ? SA-yes    Health maintenance:  [id number] NILM HPV neg, 12/19/18 NILM HPV Neg, 8/1/17 NILM HPV+, h/o LEEP in 2004 for high-grade dysplasia  Mammo-1/2021 B1  Colonoscopy- Denies, has referral    Past Medical History:   Diagnosis Date    Abnormal Papanicolaou smear of cervix     Depression with anxiety 1995    h/o psychiatric hosp 2013 ([place])    Family history of skin cancer     History of migraine     preivoulsy required Botox    Sun-damaged skin     childhood and young adult    Sunburn, blistering     childhood    Tanning bed exposure     Unspecified hypothyroidism        Past Surgical History:   Procedure Laterality Date    HX BREAST AUGMENTATION  2006    bilateral    HX LEEP PROCEDURE  2004    HX UROLOGICAL      urethral dilation 11years of age   Nini [place] IMPLANT BREAST SILICONE/EQ Bilateral     2006 [address] [facility] [facility] DUSTIN MAMMOGRAM DIGITAL BILATERAL  5/13       Family History   Problem Relation Age of Onset    Hypertension Father     Cancer Father         AML    Arthritis-osteo Mother         TKR    Hypertension Mother     Parkinson's Disease Mother     Bleeding Prob Maternal Aunt 70    Breast Cancer Maternal Aunt         46s    Cancer Maternal Grandmother         stomach  Breast Cancer Cousin 39       Social History     Socioeconomic History    Marital status:      Spouse name: .  Number of children: 0    Years of education: college    Highest education level: Not on file   Occupational History    Occupation: [address] [employer]      Employer: [employer] Financial resource strain: Not on file    Food insecurity     Worry: Not on file     Inability: Not on file   [employer] needs     Medical: Not on file     Non-medical: Not on file   Tobacco Use    Smoking status: Never Smoker    Smokeless tobacco: Never Used   Substance and Sexual Activity    Alcohol use:  Yes     Alcohol/week: 1.0 standard drinks     Types: 1 Standard drinks or equivalent per week     Frequency: 2-4 times a month     Drinks per session: 1 or 2     Binge frequency: Never     Comment: 1 drink most weeks    Drug use: No    Sexual activity: Yes     Partners: Male     Birth control/protection: Pill   Lifestyle    Physical activity     Days per week: Not on file     Minutes per session: Not on file    Stress: Not on file   Relationships    Social connections     Talks on phone: Not on file     Gets together: Not on file     Attends Sikhism service: Not on file     Active member of club or organization: Not on file     Attends meetings of clubs or organizations: Not on file     Relationship status: Not on file    Intimate partner violence     Fear of current or ex partner: Not on file     Emotionally abused: Not on file     Physically abused: Not on file     Forced sexual activity: Not on file   Other Topics Concern     Service Not Asked    Blood Transfusions Not Asked    Caffeine Concern Not Asked    Occupational Exposure Not Asked   Veto Risk Hazards Not Asked    Sleep Concern Not Asked    Stress Concern Not Asked    Weight Concern Not Asked    Special Diet Not Asked    Back Care Not Asked    Exercise No     Comment: not exercising     Bike Helmet Not Asked   [address] [facility] Not Asked    Self-Exams Not Asked   Social History Narrative    Not on file       Current Outpatient Medications   Medication Sig Dispense Refill    norethindrone-ethinyl estradiol (JUNEL FE 1/20) 1 mg-20 mcg (21)/75 mg (7) tab Take 1 Tab by mouth daily for 28 days. 3 Dose Pack 4    levothyroxine (SYNTHROID) 137 mcg tablet Take 137 mcg by mouth Daily (before breakfast). 90 Tab 1    buPROPion XL (WELLBUTRIN XL) 300 mg XL tablet TAKE 1 TABLET EVERY MORNING 90 Tab 3    escitalopram oxalate (LEXAPRO) 20 mg tablet TAKE 1 TABLET DAILY 90 Tab 3    cholecalciferol (VITAMIN D3) 1,000 unit tablet Take  by mouth daily.  cyanocobalamin (VITAMIN B-12) 1,000 mcg sublingual tablet Take 1,000 mcg by mouth daily.  Biotin 2,500 mcg cap Take  by mouth.          Allergies   Allergen Reactions    Pcn [Penicillins] Rash       Review of Systems - History obtained from the patient  Constitutional: negative for weight loss, fever, night sweats  HEENT: negative for hearing loss, earache, congestion, snoring, sorethroat  CV: negative for chest pain, palpitations, edema  Resp: negative for cough, shortness of breath, wheezing  GI: negative for change in bowel habits, abdominal pain, black or bloody stools  : negative for frequency, dysuria, hematuria, vaginal discharge  MSK: negative for back pain, joint pain, muscle pain  Breast: negative for breast lumps, nipple discharge, galactorrhea  Skin :negative for itching, rash, hives  Neuro: negative for dizziness, headache, confusion, weakness  Psych: negative for anxiety, depression, change in mood  Heme/lymph: negative for bleeding, bruising, pallor    Physical Exam  Visit Vitals  /60   Wt 164 lb (74.4 kg)   BMI 28.15 kg/m²     Constitutional  · Appearance: well-nourished, well developed, alert, in no acute distress    HENT  · Head and Face: appears normal    Chest  · Respiratory Effort: non-labored breathing, CTAB    Cardiovascular   Heart: Reg rate and rhythm  · Extremities: no peripheral edema    Breast exam: bilateral breast augmentation, otherwise unremarkable exam, normal appearance, symmetric, no lumps or masses, no skin changes, no nipple inversion or d/c, etc    Gastrointestinal  · Abdominal Examination: abdomen non-tender to palpation, normal bowel sounds, no masses present  · Liver and spleen: no hepatomegaly present, spleen not palpable  · Hernias: no hernias identified    Genitourinary  · External Genitalia: normal appearance for age, no discharge present, no tenderness present, no inflammatory lesions present, no masses present, no atrophy present  · Vagina: normal vaginal vault without central or paravaginal defects, no discharge present, no inflammatory lesions present, no masses present  · Bladder: non-tender to palpation  · Urethra: appears normal  · Cervix: normal, no cervicitis, no lesions, no CMT, +bleeding after pap   · Uterus: normal size, shape and consistency  · Adnexa: no adnexal tenderness present, no adnexal masses present  · Perineum: perineum within normal limits, no evidence of trauma, no rashes or skin lesions present    Skin  · General Inspection: no rash, no lesions identified    Neurologic/Psychiatric  · Mental Status:  · Orientation: grossly oriented to person, place and time  · Mood and Affect: mood normal, affect appropriate      Assessment/Plan:  46 y.o. G0 presenting for annual exam.    Annual exam:  -diet/exercies/lifestyle counseling  -pap/HPV today  -Vaginal STI screening  -mammo utd, repeat 1 year  -colonoscopy to be scheduled (referred by PCP)  -resume Junel OCPs, advised BP check when sees PCP next month    RTC: 1 year for AE or sooner jesus Evans MD  3/17/2021  8:30 AM

## 2021-03-23 LAB
C TRACH RRNA CVX QL NAA+PROBE: NEGATIVE
CYTOLOGIST CVX/VAG CYTO: NORMAL
CYTOLOGY CVX/VAG DOC CYTO: NORMAL
CYTOLOGY CVX/VAG DOC THIN PREP: NORMAL
DX ICD CODE: NORMAL
HPV I/H RISK 4 DNA CVX QL PROBE+SIG AMP: NEGATIVE
Lab: NORMAL
N GONORRHOEA RRNA CVX QL NAA+PROBE: NEGATIVE
OTHER STN SPEC: NORMAL
STAT OF ADQ CVX/VAG CYTO-IMP: NORMAL

## 2021-04-06 ENCOUNTER — OFFICE VISIT (OUTPATIENT)
Dept: FAMILY MEDICINE CLINIC | Age: 51
End: 2021-04-06
Payer: COMMERCIAL

## 2021-04-06 VITALS
BODY MASS INDEX: 27.83 KG/M2 | OXYGEN SATURATION: 97 % | RESPIRATION RATE: 18 BRPM | HEIGHT: 64 IN | DIASTOLIC BLOOD PRESSURE: 70 MMHG | SYSTOLIC BLOOD PRESSURE: 100 MMHG | HEART RATE: 74 BPM | TEMPERATURE: 98.1 F | WEIGHT: 163 LBS

## 2021-04-06 DIAGNOSIS — E03.9 ACQUIRED HYPOTHYROIDISM: ICD-10-CM

## 2021-04-06 DIAGNOSIS — Z12.31 ENCOUNTER FOR SCREENING MAMMOGRAM FOR MALIGNANT NEOPLASM OF BREAST: ICD-10-CM

## 2021-04-06 DIAGNOSIS — E78.00 HIGH CHOLESTEROL: ICD-10-CM

## 2021-04-06 DIAGNOSIS — Z12.11 COLON CANCER SCREENING: ICD-10-CM

## 2021-04-06 DIAGNOSIS — Z00.00 ROUTINE ADULT HEALTH MAINTENANCE: Primary | ICD-10-CM

## 2021-04-06 LAB
ALBUMIN SERPL-MCNC: 3.8 G/DL (ref 3.5–5)
ALBUMIN/GLOB SERPL: 1.1 {RATIO} (ref 1.1–2.2)
ALP SERPL-CCNC: 90 U/L (ref 45–117)
ALT SERPL-CCNC: 22 U/L (ref 12–78)
ANION GAP SERPL CALC-SCNC: 5 MMOL/L (ref 5–15)
AST SERPL-CCNC: 14 U/L (ref 15–37)
BASOPHILS # BLD: 0.1 K/UL (ref 0–0.1)
BASOPHILS NFR BLD: 1 % (ref 0–1)
BILIRUB SERPL-MCNC: 0.5 MG/DL (ref 0.2–1)
BUN SERPL-MCNC: 15 MG/DL (ref 6–20)
BUN/CREAT SERPL: 17 (ref 12–20)
CALCIUM SERPL-MCNC: 8.7 MG/DL (ref 8.5–10.1)
CHLORIDE SERPL-SCNC: 106 MMOL/L (ref 97–108)
CHOLEST SERPL-MCNC: 229 MG/DL
CO2 SERPL-SCNC: 29 MMOL/L (ref 21–32)
CREAT SERPL-MCNC: 0.86 MG/DL (ref 0.55–1.02)
DIFFERENTIAL METHOD BLD: NORMAL
EOSINOPHIL # BLD: 0.1 K/UL (ref 0–0.4)
EOSINOPHIL NFR BLD: 1 % (ref 0–7)
ERYTHROCYTE [DISTWIDTH] IN BLOOD BY AUTOMATED COUNT: 13.2 % (ref 11.5–14.5)
EST. AVERAGE GLUCOSE BLD GHB EST-MCNC: 105 MG/DL
GLOBULIN SER CALC-MCNC: 3.6 G/DL (ref 2–4)
GLUCOSE SERPL-MCNC: 91 MG/DL (ref 65–100)
HBA1C MFR BLD: 5.3 % (ref 4–5.6)
HCT VFR BLD AUTO: 43 % (ref 35–47)
HDLC SERPL-MCNC: 93 MG/DL
HDLC SERPL: 2.5 {RATIO} (ref 0–5)
HGB BLD-MCNC: 13.4 G/DL (ref 11.5–16)
IMM GRANULOCYTES # BLD AUTO: 0 K/UL (ref 0–0.04)
IMM GRANULOCYTES NFR BLD AUTO: 0 % (ref 0–0.5)
LDLC SERPL CALC-MCNC: 122.6 MG/DL (ref 0–100)
LIPID PROFILE,FLP: ABNORMAL
LYMPHOCYTES # BLD: 2.1 K/UL (ref 0.8–3.5)
LYMPHOCYTES NFR BLD: 26 % (ref 12–49)
MCH RBC QN AUTO: 30.4 PG (ref 26–34)
MCHC RBC AUTO-ENTMCNC: 31.2 G/DL (ref 30–36.5)
MCV RBC AUTO: 97.5 FL (ref 80–99)
MONOCYTES # BLD: 0.5 K/UL (ref 0–1)
MONOCYTES NFR BLD: 6 % (ref 5–13)
NEUTS SEG # BLD: 5.3 K/UL (ref 1.8–8)
NEUTS SEG NFR BLD: 66 % (ref 32–75)
NRBC # BLD: 0 K/UL (ref 0–0.01)
NRBC BLD-RTO: 0 PER 100 WBC
PLATELET # BLD AUTO: 224 K/UL (ref 150–400)
PMV BLD AUTO: 12.2 FL (ref 8.9–12.9)
POTASSIUM SERPL-SCNC: 4.6 MMOL/L (ref 3.5–5.1)
PROT SERPL-MCNC: 7.4 G/DL (ref 6.4–8.2)
RBC # BLD AUTO: 4.41 M/UL (ref 3.8–5.2)
SODIUM SERPL-SCNC: 140 MMOL/L (ref 136–145)
T4 FREE SERPL-MCNC: 1.1 NG/DL (ref 0.8–1.5)
TRIGL SERPL-MCNC: 67 MG/DL (ref ?–150)
TSH SERPL DL<=0.05 MIU/L-ACNC: 0.53 UIU/ML (ref 0.36–3.74)
VLDLC SERPL CALC-MCNC: 13.4 MG/DL
WBC # BLD AUTO: 8 K/UL (ref 3.6–11)

## 2021-04-06 PROCEDURE — 99396 PREV VISIT EST AGE 40-64: CPT | Performed by: NURSE PRACTITIONER

## 2021-04-06 RX ORDER — LEVOTHYROXINE SODIUM 137 UG/1
137 TABLET ORAL
Qty: 90 TAB | Refills: 0 | Status: SHIPPED | OUTPATIENT
Start: 2021-04-06 | End: 2021-04-07 | Stop reason: SDUPTHER

## 2021-04-06 NOTE — PROGRESS NOTES
Identified pt with two pt identifiers(name and ). Reviewed record in preparation for visit and have obtained necessary documentation. Chief Complaint   Patient presents with    Complete Physical        Vitals:    21 0910   Weight: 163 lb (73.9 kg)   Height: 5' 4\" (1.626 m)   PainSc:   0 - No pain       Health Maintenance Due   Topic    Colorectal Cancer Screening Combo        Coordination of Care Questionnaire:  :   1) Have you been to an emergency room, urgent care, or hospitalized since your last visit? If yes, where when, and reason for visit? no       2. Have seen or consulted any other health care provider since your last visit? If yes, where when, and reason for visit? Karen Jorgensen Pap    Patient is accompanied by self I have received verbal consent from Reyes Isbell to discuss any/all medical information while they are present in the room.

## 2021-04-06 NOTE — PROGRESS NOTES
[address] Note  HPI:   Solange Alejandre is a 46 y.o. female who presents for annual exam.    Endocrine Review  Patient is seen for evaluation of hypothyroidism. Diagnosed in childhood. Since last visit: no changes.   Reports medication compliance: all the time and is taking separate from all her other meds.  She reports the following concerns/problems/med side effects: none.  Lab review: at goal greater than 6 months ago. Endorses 10 lb weight gain in the past year but was able to loose it with exercise and diet and has been stable. Mildly depressed mood a few months ago but currently doing well. Denies skin, nail or hair changes. Denies constipation, diarrhea. Denies chest pain, palpations, tremor. Mood is good. Denies depressed mood or anxiety. Taking her mood medication with no side effects. Contraception: [address] per OBGYN. Up to date on pap and mammogram with OBGYN. Exercise: yoga  Diet:balanced diet. Getting COVID vaccine this week. Health Maintenance   Topic Date Due    Colorectal Cancer Screening Combo  Never done    Breast Cancer Screen Mammogram  01/29/2023    PAP AKA CERVICAL CYTOLOGY  03/17/2024    Lipid Screen  01/08/2025    DTaP/Tdap/Td series (2 - Td) 07/10/2025    Hepatitis C Screening  Completed    Shingrix Vaccine Age 50>  Completed    Flu Vaccine  Completed    Pneumococcal 0-64 years  Aged Out     Health Maintenance reviewed      Current Outpatient Medications   Medication Sig Dispense Refill    levothyroxine (SYNTHROID) 137 mcg tablet Take 137 mcg by mouth Daily (before breakfast). 90 Tab 0    buPROPion XL (WELLBUTRIN XL) 300 mg XL tablet TAKE 1 TABLET EVERY MORNING 90 Tab 3    escitalopram oxalate (LEXAPRO) 20 mg tablet TAKE 1 TABLET DAILY 90 Tab 3    Biotin 2,500 mcg cap Take  by mouth.  norethindrone-ethinyl estradiol (JUNEL FE 1/20) 1 mg-20 mcg (21)/75 mg (7) tab Take 1 Tab by mouth daily for 28 days.  3 Dose Pack 4      Allergies Allergen Reactions    Pcn [Penicillins] Rash      Immunization History   Administered Date(s) Administered    Influenza Vaccine 10/01/2013, 12/02/2014, 10/16/2015, 10/04/2017, 12/18/2018, 10/19/2019    Influenza Vaccine (>6 mo Afluria QUAD Vial 78719 (0.25 mL) / 58501 (0.5 mL)) 10/03/2017    Influenza Vaccine (Mdck Quadrivalent)(>4 Yrs Flucelvax Quad vial 43495) 10/19/2019    Influenza Vaccine (Quad) Mdck Pf (>4 Yrs Flucelvax QUAD 63109) 12/18/2018    Influenza Vaccine (Quad) PF (>6 Mo Flulaval, Fluarix, and >3 Yrs Afluria, Fluzone [id number]) 10/03/2017    Influenza Vaccine PF 09/30/2020    Influenza Vaccine Whole 10/01/2010    TD Vaccine 02/28/2008    Tdap 07/10/2015    Zoster Recombinant 09/30/2020, 12/05/2020     Patient Active Problem List   Diagnosis Code    Hypothyroidism E03.9    Headache(784.0) R51    Pap smear for cervical cancer screening Z12.4    Major depression in complete remission ([facility] [address] F32.5    Generalized anxiety disorder F41.1     Past Medical History:   Diagnosis Date    Abnormal Papanicolaou smear of cervix     Depression with anxiety 1995    h/o psychiatric hosp 2013 ()    Family history of skin cancer     History of migraine     preivoulsy required Botox    Sun-damaged skin     childhood and young adult    Sunburn, blistering     childhood    Tanning bed exposure     Unspecified hypothyroidism       Past Surgical History:   Procedure Laterality Date    HX BREAST AUGMENTATION  2006    bilateral    HX LEEP PROCEDURE  2004    HX UROLOGICAL      urethral dilation 11years of age   Denise Ochoa [address] [facility] Bilateral     2006 [place]    DUSTIN MAMMOGRAM DIGITAL BILATERAL  5/13      No LMP recorded.    Family History   Problem Relation Age of Onset    Hypertension Father     Cancer Father         AML    Arthritis-osteo Mother         TKR    Hypertension Mother     Parkinson's Disease Mother     Bleeding Prob Maternal Aunt 70    Breast Cancer Maternal Aunt         46s  Cancer Maternal Grandmother         stomach    Breast Cancer Cousin 39      Social History     Socioeconomic History    Marital status:      Spouse name: .  Number of children: 0    Years of education: college    Highest education level: Not on file   Occupational History    Occupation: [address] [employer]      Employer: [employer] Financial resource strain: Not on file    Food insecurity     Worry: Not on file     Inability: Not on file   [employer] needs     Medical: Not on file     Non-medical: Not on file   Tobacco Use    Smoking status: Never Smoker    Smokeless tobacco: Never Used   Substance and Sexual Activity    Alcohol use:  Yes     Alcohol/week: 1.0 standard drinks     Types: 1 Standard drinks or equivalent per week     Frequency: 2-4 times a month     Drinks per session: 1 or 2     Binge frequency: Never     Comment: 1 drink most weeks    Drug use: No    Sexual activity: Not Currently     Partners: Male     Birth control/protection: Pill   Lifestyle    Physical activity     Days per week: Not on file     Minutes per session: Not on file    Stress: Not on file   Relationships    Social connections     Talks on phone: Not on file     Gets together: Not on file     Attends Mormon service: Not on file     Active member of club or organization: Not on file     Attends meetings of clubs or organizations: Not on file     Relationship status: Not on file    Intimate partner violence     Fear of current or ex partner: Not on file     Emotionally abused: Not on file     Physically abused: Not on file     Forced sexual activity: Not on file   Other Topics Concern     Service Not Asked    Blood Transfusions Not Asked    Caffeine Concern Not Asked    Occupational Exposure Not Asked   [place] [employer] Hazards Not Asked    Sleep Concern Not Asked    Stress Concern Not Asked    Weight Concern Not Asked    Special Diet Not Asked    Back Care Not Asked  Exercise No     Comment: not exercising     Bike Helmet Not Asked    Seat Belt Not Asked    Self-Exams Not Asked   Social History Narrative    Not on file        ROS:     Review of Systems   Constitutional: Negative for chills, diaphoresis, fever, malaise/fatigue and weight loss. No unexplained weight changes   HENT: Negative for congestion, hearing loss, sore throat and tinnitus. Eyes: Negative for blurred vision. Respiratory: Negative for cough, shortness of breath and wheezing. Cardiovascular: Negative for chest pain, palpitations, orthopnea, claudication and leg swelling. Gastrointestinal: Negative for abdominal pain, blood in stool, constipation, diarrhea, heartburn, melena, nausea and vomiting. Genitourinary: Negative for dysuria, frequency and urgency. Musculoskeletal: Negative for joint pain and myalgias. Skin: Negative. Negative for itching and rash. Neurological: Negative for dizziness, seizures, weakness and headaches. Endo/Heme/Allergies: Negative for polydipsia. Psychiatric/Behavioral: Negative for depression. The patient is not nervous/anxious and does not have insomnia. Physical Exam:     Visit Vitals  /70 (BP 1 Location: Right upper arm, BP Patient Position: Sitting, BP Cuff Size: Adult)   Pulse 74   Temp 98.1 °F (36.7 °C) (Temporal)   Resp 18   Ht 5' 4\" (1.626 m)   Wt 163 lb (73.9 kg)   SpO2 97%   BMI 27.98 kg/m²        Physical Exam  Vitals signs and nursing note reviewed. Constitutional:       General: She is not in acute distress. Appearance: Normal appearance. She is well-developed, well-groomed and normal weight. She is not ill-appearing, toxic-appearing or diaphoretic. HENT:      Head: Normocephalic and atraumatic. Right Ear: Hearing, tympanic membrane, ear canal and external ear normal. No middle ear effusion. Tympanic membrane is not injected or scarred.       Left Ear: Hearing, tympanic membrane, ear canal and external ear normal.  No middle ear effusion. Tympanic membrane is not injected or scarred. Nose: No septal deviation or mucosal edema. Mouth/Throat:      Mouth: Mucous membranes are not pale, not dry and not cyanotic. Eyes:      General: Lids are normal.      Conjunctiva/sclera: Conjunctivae normal.      Pupils: Pupils are equal, round, and reactive to light. Neck:      Musculoskeletal: Full passive range of motion without pain and neck supple. Thyroid: No thyromegaly. Trachea: Trachea and phonation normal. No tracheal deviation. Cardiovascular:      Rate and Rhythm: Normal rate and regular rhythm. Pulses: Normal pulses. Radial pulses are 2+ on the right side and 2+ on the left side. Dorsalis pedis pulses are 2+ on the right side and 2+ on the left side. Heart sounds: Normal heart sounds, S1 normal and S2 normal. No murmur. No friction rub. No gallop. Pulmonary:      Effort: Pulmonary effort is normal. No respiratory distress. Breath sounds: Normal breath sounds. No decreased breath sounds, wheezing, rhonchi or rales. Chest:      Chest wall: No tenderness. Abdominal:      General: Abdomen is flat. Bowel sounds are normal. There is no distension. Palpations: Abdomen is soft. There is no mass. Tenderness: There is no abdominal tenderness. There is no guarding or rebound. Musculoskeletal: Normal range of motion. General: No tenderness or deformity. Right lower leg: No edema. Left lower leg: No edema. Lymphadenopathy:      Cervical: No cervical adenopathy. Skin:     General: Skin is warm and dry. Capillary Refill: Capillary refill takes less than 2 seconds. Findings: No rash. Nails: There is no clubbing. Neurological:      Mental Status: She is alert and oriented to person, place, and time. Cranial Nerves: No cranial nerve deficit. Sensory: No sensory deficit.       Gait: Gait normal.   Psychiatric: Attention and Perception: Attention normal.         Mood and Affect: Mood normal.         Speech: Speech normal.         Behavior: Behavior normal. Behavior is cooperative. Thought Content: Thought content normal.         Judgment: Judgment normal.            Assessment/ Plan:   Full age appropriate History and Physical exam as well as health care maintenance  performed and discussed today. Risk factor modification discussed today includes safe sex practices, healthy diet and exercise, and seat belt use. Continue current medications. Diagnoses and all orders for this visit:    1. Routine adult health maintenance: healthy appearing. Referral for colonoscopy otherwise UTD on health maintenance.   -     HEMOGLOBIN A1C WITH EAG; Future  -     LIPID PANEL; Future  -     METABOLIC PANEL, COMPREHENSIVE; Future  -     CBC WITH AUTOMATED DIFF; Future    2. Acquired hypothyroidism: Historically stable. Update labs today.   -     TSH 3RD GENERATION; Future  -     T4, FREE; Future  -     levothyroxine (SYNTHROID) 137 mcg tablet; Take 137 mcg by mouth Daily (before breakfast). 3. High cholesterol  -     LIPID PANEL; Future  -     METABOLIC PANEL, COMPREHENSIVE; Future      4. Colon cancer screening; referral provided. -     REFERRAL TO GASTROENTEROLOGY      Follow-up and Dispositions    · Return in about 1 year (around 4/6/2022) for Routine Physical Exam.        Discussed expected course/resolution/complications of diagnosis in detail with patient.    Medication risks/benefits/costs/interactions/alternatives discussed with patient.    Pt was given an after visit summary which includes diagnoses, current medications & vitals.  Pt expressed understanding with the diagnosis and plan.

## 2021-04-07 DIAGNOSIS — E03.9 ACQUIRED HYPOTHYROIDISM: ICD-10-CM

## 2021-04-07 RX ORDER — LEVOTHYROXINE SODIUM 137 UG/1
137 TABLET ORAL
Qty: 90 TAB | Refills: 2 | Status: SHIPPED | OUTPATIENT
Start: 2021-04-07 | End: 2022-02-21 | Stop reason: SDUPTHER

## 2021-04-07 NOTE — PROGRESS NOTES
Rupa Jaquez,     Overall, your labs look great. Your thyroid levels are at goal. I will send in additional refills. We will repeat your thyroid labs next year at your annual exam.     The rest of your labs look great. Your cholesterol was mildly elevated. According to the [facility] [employer] of Cardiology) and the AHA (American Heart Association) ASCVD Risk Calculator (cardiovascular risk calculation based on age, gender, ethnicity, blood pressure, cholesterol, smoking history, any diabetes history, heart meds): your calculated 10 yr cardiovascular risk is low at 0.5% for heart attack or stroke. Based on these scores according to AHA/ACC guidelines, medication is not warranted at this time. Weight loss is important to correct high cholesterol. Please continue/start routine exercise, and follow a low cholesterol diet. Limit fried foods, fatty foods, butter, gravy, red meat, ice cream, cheese, and eggs in your diet, which are all high in cholesterol. [email or web address] has great resources for healthy diet. Please let me know if you have any additional questions.   Joann Beckman NP

## 2021-09-30 ENCOUNTER — OFFICE VISIT (OUTPATIENT)
Dept: URGENT CARE | Age: 51
End: 2021-09-30
Payer: COMMERCIAL

## 2021-09-30 VITALS — OXYGEN SATURATION: 97 % | TEMPERATURE: 98.6 F | RESPIRATION RATE: 16 BRPM | HEART RATE: 48 BPM

## 2021-09-30 DIAGNOSIS — Z20.822 ENCOUNTER FOR LABORATORY TESTING FOR COVID-19 VIRUS: Primary | ICD-10-CM

## 2021-09-30 PROCEDURE — 99211 OFF/OP EST MAY X REQ PHY/QHP: CPT | Performed by: FAMILY MEDICINE

## 2021-10-02 LAB
SARS-COV-2, NAA 2 DAY TAT: NORMAL
SARS-COV-2, NAA: NOT DETECTED

## 2022-01-28 ENCOUNTER — OFFICE VISIT (OUTPATIENT)
Dept: BEHAVIORAL/MENTAL HEALTH CLINIC | Age: 52
End: 2022-01-28
Payer: COMMERCIAL

## 2022-01-28 VITALS
WEIGHT: 173.8 LBS | BODY MASS INDEX: 30.79 KG/M2 | TEMPERATURE: 97.3 F | HEART RATE: 82 BPM | OXYGEN SATURATION: 98 % | SYSTOLIC BLOOD PRESSURE: 120 MMHG | HEIGHT: 63 IN | DIASTOLIC BLOOD PRESSURE: 83 MMHG | RESPIRATION RATE: 17 BRPM

## 2022-01-28 DIAGNOSIS — F32.5 MAJOR DEPRESSION IN COMPLETE REMISSION (HCC): Primary | ICD-10-CM

## 2022-01-28 DIAGNOSIS — F41.1 GENERALIZED ANXIETY DISORDER: ICD-10-CM

## 2022-01-28 PROCEDURE — 99204 OFFICE O/P NEW MOD 45 MIN: CPT | Performed by: NURSE PRACTITIONER

## 2022-01-28 RX ORDER — LANOLIN ALCOHOL/MO/W.PET/CERES
1000 CREAM (GRAM) TOPICAL DAILY
COMMUNITY
End: 2022-04-15

## 2022-01-28 RX ORDER — ESCITALOPRAM OXALATE 20 MG/1
TABLET ORAL
Qty: 90 TABLET | Refills: 3 | Status: SHIPPED | OUTPATIENT
Start: 2022-01-28 | End: 2022-03-24 | Stop reason: SDUPTHER

## 2022-01-28 RX ORDER — BUPROPION HYDROCHLORIDE 300 MG/1
TABLET ORAL
Qty: 90 TABLET | Refills: 3 | Status: SHIPPED | OUTPATIENT
Start: 2022-01-28 | End: 2022-03-24 | Stop reason: SDUPTHER

## 2022-01-28 NOTE — PROGRESS NOTES
Chief Complaint   Patient presents with   [facility] Patient     Former Dr. Turner Christensen patient at [address] [facility]  /83 (BP 1 Location: Left arm, BP Patient Position: Sitting, BP Cuff Size: Adult)   Pulse 82   Temp 97.3 °F (36.3 °C)   Resp 17   Ht 5' 3\" (1.6 m)   Wt 78.8 kg (173 lb 12.8 oz)   SpO2 98%   BMI 30.79 kg/m²     Prior to Admission medications    Medication Sig Start Date End Date Taking? Authorizing Provider   cyanocobalamin (Vitamin B-12) 1,000 mcg tablet Take 1,000 mcg by mouth daily. Yes Provider, Historical   levothyroxine (SYNTHROID) 137 mcg tablet Take 137 mcg by mouth Daily (before breakfast). 4/7/21  Yes Priti Mckeon NP   buPROPion XL (WELLBUTRIN XL) 300 mg XL tablet TAKE 1 TABLET EVERY MORNING 6/19/20  Yes Sultana LOUISE Townsend MD   escitalopram oxalate (LEXAPRO) 20 mg tablet TAKE 1 TABLET DAILY 6/19/20  Yes Sultana LOUISE Townsend MD   Biotin 2,500 mcg cap Take  by mouth.    Yes Provider, Historical
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
## 2022-02-14 DIAGNOSIS — E03.9 ACQUIRED HYPOTHYROIDISM: ICD-10-CM

## 2022-02-14 RX ORDER — LEVOTHYROXINE SODIUM 137 UG/1
137 TABLET ORAL
Qty: 90 TABLET | Refills: 0 | Status: CANCELLED | OUTPATIENT
Start: 2022-02-14

## 2022-02-14 NOTE — TELEPHONE ENCOUNTER
Last Visit: 4/6/21 NP Edward Huber, labs 4/2021  Next Appointment: Not scheduled- Needs new provider/labs appt- due April 2022  Previous Refill Encounter(s): 4/7/21 90 + 2    Requested Prescriptions     Pending Prescriptions Disp Refills    levothyroxine (SYNTHROID) 137 mcg tablet 90 Tablet 0     Sig: Take 1 Tablet by mouth Daily (before breakfast).

## 2022-02-21 DIAGNOSIS — E03.9 ACQUIRED HYPOTHYROIDISM: ICD-10-CM

## 2022-02-21 NOTE — TELEPHONE ENCOUNTER
Last Visit: 4/6/21 NP Loren Cates, labs 4/2021  Next Appointment: 4/15/22 CATIA Briggs  Previous Refill Encounter(s): 4/7/21 90 + 2    Requested Prescriptions     Pending Prescriptions Disp Refills    levothyroxine (SYNTHROID) 137 mcg tablet 90 Tablet 0     Sig: Take 1 Tablet by mouth Daily (before breakfast).

## 2022-02-22 RX ORDER — LEVOTHYROXINE SODIUM 137 UG/1
137 TABLET ORAL
Qty: 90 TABLET | Refills: 0 | Status: SHIPPED | OUTPATIENT
Start: 2022-02-22 | End: 2022-03-20 | Stop reason: SDUPTHER

## 2022-03-17 ENCOUNTER — OFFICE VISIT (OUTPATIENT)
Dept: FAMILY MEDICINE CLINIC | Age: 52
End: 2022-03-17
Payer: COMMERCIAL

## 2022-03-17 VITALS
OXYGEN SATURATION: 98 % | HEIGHT: 63 IN | BODY MASS INDEX: 30.69 KG/M2 | HEART RATE: 73 BPM | SYSTOLIC BLOOD PRESSURE: 102 MMHG | DIASTOLIC BLOOD PRESSURE: 69 MMHG | WEIGHT: 173.2 LBS | TEMPERATURE: 98 F | RESPIRATION RATE: 16 BRPM

## 2022-03-17 DIAGNOSIS — F32.5 MAJOR DEPRESSION IN COMPLETE REMISSION (HCC): ICD-10-CM

## 2022-03-17 DIAGNOSIS — Z13.220 SCREENING, LIPID: ICD-10-CM

## 2022-03-17 DIAGNOSIS — H60.393 OTHER INFECTIVE ACUTE OTITIS EXTERNA OF BOTH EARS: Primary | ICD-10-CM

## 2022-03-17 DIAGNOSIS — E03.9 ACQUIRED HYPOTHYROIDISM: ICD-10-CM

## 2022-03-17 DIAGNOSIS — J34.89 NOSTRIL INFECTION: ICD-10-CM

## 2022-03-17 DIAGNOSIS — Z11.3 ROUTINE SCREENING FOR STI (SEXUALLY TRANSMITTED INFECTION): ICD-10-CM

## 2022-03-17 PROCEDURE — 99212 OFFICE O/P EST SF 10 MIN: CPT | Performed by: NURSE PRACTITIONER

## 2022-03-17 RX ORDER — MUPIROCIN 20 MG/G
OINTMENT TOPICAL DAILY
Qty: 15 G | Refills: 0 | Status: SHIPPED | OUTPATIENT
Start: 2022-03-17 | End: 2022-04-15

## 2022-03-17 RX ORDER — OFLOXACIN 3 MG/ML
5 SOLUTION AURICULAR (OTIC) DAILY
Qty: 5 ML | Refills: 0 | Status: SHIPPED | OUTPATIENT
Start: 2022-03-17 | End: 2022-03-24

## 2022-03-17 NOTE — PROGRESS NOTES
[facility] Note     Chantal Johnson (: 1970) is a 46 y.o. female, established patient, here for evaluation of the following chief complaint(s):  Ear Pain (both ears)       ASSESSMENT/PLAN:  1. Other infective acute otitis externa of both ears  -     ofloxacin (FLOXIN) 0.3 % otic solution; Administer 5 Drops into each ear daily for 7 days. , Normal, Disp-5 mL, R-0  -Otitis externa handout provided  -Signs and symptoms in which to contact provider discussed with patient    2. Routine screening for STI (sexually transmitted infection)  -     HEPATITIS PANEL, ACUTE; Future  -     HIV 1/2 AG/AB, 4TH GENERATION,W RFLX CONFIRM; Future  -     RPR; Future    3. Acquired hypothyroidism  -     TSH 3RD GENERATION; Future    4. Major depression in complete remission (HCC)  -    Stable on Lexapro and bupropion    5. Screening, lipid  -     METABOLIC PANEL, COMPREHENSIVE; Future  -     LIPID PANEL; Future    6. Nostril infection  Comments:  superficial    Orders:  -     mupirocin (BACTROBAN) 2 % ointment; Apply  to affected area daily. x7 days, Normal, Disp-15 g, R-0        Return in about 1 month (around 2022), or if symptoms worsen or fail to improve. SUBJECTIVE/OBJECTIVE:    Chantal Johnson is a 46 y.o. female seen today for possible ear infection. Complains of bilateral ear irritation described as \"raw\" and itching. She has had clear drainage from the right ear. Her right nostril has also become very inflamed with increased nasal drainage. Ms. Kyra Mcintosh is felt that she has had a low-grade fever but when her temperature has been checked and has not been elevated. She has been using homeopathic eardrops which have helped minimally. Denies headache, blurry vision or dizziness. Denies sinus pain, cough or sore throat. Kyra Mcintosh is also requesting STI testing with blood draw as she has a new partner. Plans to have swabs done by GYN. No acute concern for complaint.     REVIEW OF SYSTEMS:    Review of Systems   Constitutional: Negative. HENT: Positive for ear discharge, ear pain and sneezing. Negative for congestion, hearing loss, sinus pressure, sinus pain, sore throat, tinnitus and trouble swallowing. Right nostril feels \"raw\"       Eyes: Negative. Respiratory: Negative. Cardiovascular: Negative. Gastrointestinal: Negative. Musculoskeletal: Negative. Skin: Negative. Neurological: Negative. VITAL SIGNS:    Wt Readings from Last 3 Encounters:   03/17/22 173 lb 3.2 oz (78.6 kg)   01/28/22 173 lb 12.8 oz (78.8 kg)   04/06/21 163 lb (73.9 kg)     Temp Readings from Last 3 Encounters:   03/17/22 98 °F (36.7 °C) (Temporal)   01/28/22 97.3 °F (36.3 °C)   09/30/21 98.6 °F (37 °C)     BP Readings from Last 3 Encounters:   03/17/22 102/69   01/28/22 120/83   04/06/21 100/70     Pulse Readings from Last 3 Encounters:   03/17/22 73   01/28/22 82   09/30/21 (!) 48           PHYSICAL EXAMINATION:       General: Alert, cooperative, no distress  HEENT: Normocephalic, PERRL, sclera are nonicteric, bilateral external ears are erythematous with skin flaking and crusting. ears nontender. Right nare erythematous. Respiratory: Breathing comfortably, in no acute respiratory distress. Clear to auscultation bilaterally. Cardiovascular: Regular rate and rhythm, S1, S2 normal, no murmur, click, rub or gallop. Extremities: no edema. Abdomen: Soft, non-tender, not distended. Bowel sounds normal. No masses or organomegaly. MSK: Extremities normal appearing, atraumatic, no effusion. Gait steady and unassisted. Skin: Skin color, texture, turgor normal. No rashes or lesions on exposed skin. Neurologic: A/Ox3  Psychiatric: Normal affect. Mood euthymic. Thoughts logical. Speech volume and speed normal            Treatment risks/benefits/costs/interactions/alternatives discussed with patient.   Advised patient to call back or return to office if symptoms worsen/change/persist. If patient cannot reach us or should anything more severe/urgent arise he/she should proceed directly to the nearest emergency department. Discussed expected course/resolution/complications of diagnosis in detail with patient. Patient expressed understanding with the diagnosis and plan. An electronic signature was used to authenticate this note.   -- Nick Macias NP

## 2022-03-17 NOTE — PROGRESS NOTES
Chief Complaint   Patient presents with    Ear Pain     both ears         1. \"Have you been to the ER, urgent care clinic since your last visit? Hospitalized since your last visit? \" No    2. \"Have you seen or consulted any other health care providers outside of the [address] [place] [address] since your last visit? \" No     3. For patients over 45: Has the patient had a colonoscopy? No     If the patient is female:    4. For patients over 40: Has the patient had a mammogram? Yes - no Care Gap present    5. For patients over 21: Has the patient had a pap smear?  Yes - no Care Gap present     3 most recent PHQ Screens 3/17/2022   PHQ Not Done -   Little interest or pleasure in doing things Not at all   Feeling down, depressed, irritable, or hopeless More than half the days   Total Score PHQ 2 2   Trouble falling or staying asleep, or sleeping too much -   Feeling tired or having little energy -   Poor appetite, weight loss, or overeating -   Feeling bad about yourself - or that you are a failure or have let yourself or your family down -   Trouble concentrating on things such as school, work, reading, or watching TV -   Moving or speaking so slowly that other people could have noticed; or the opposite being so fidgety that others notice -   Thoughts of being better off dead, or hurting yourself in some way -   PHQ 9 Score -       Health Maintenance Due   Topic Date Due    Colorectal Cancer Screening Combo  Never done    Flu Vaccine (1) 09/01/2021

## 2022-03-17 NOTE — PATIENT INSTRUCTIONS

## 2022-03-18 LAB
ALBUMIN SERPL-MCNC: 3.8 G/DL (ref 3.5–5)
ALBUMIN/GLOB SERPL: 1.1 {RATIO} (ref 1.1–2.2)
ALP SERPL-CCNC: 91 U/L (ref 45–117)
ALT SERPL-CCNC: 61 U/L (ref 12–78)
ANION GAP SERPL CALC-SCNC: 2 MMOL/L (ref 5–15)
AST SERPL-CCNC: 30 U/L (ref 15–37)
BILIRUB SERPL-MCNC: 0.4 MG/DL (ref 0.2–1)
BUN SERPL-MCNC: 16 MG/DL (ref 6–20)
BUN/CREAT SERPL: 16 (ref 12–20)
CALCIUM SERPL-MCNC: 9 MG/DL (ref 8.5–10.1)
CHLORIDE SERPL-SCNC: 107 MMOL/L (ref 97–108)
CHOLEST SERPL-MCNC: 224 MG/DL
CO2 SERPL-SCNC: 29 MMOL/L (ref 21–32)
CREAT SERPL-MCNC: 0.98 MG/DL (ref 0.55–1.02)
GLOBULIN SER CALC-MCNC: 3.6 G/DL (ref 2–4)
GLUCOSE SERPL-MCNC: 92 MG/DL (ref 65–100)
HAV IGM SER QL: NONREACTIVE
HBV CORE IGM SER QL: NONREACTIVE
HBV SURFACE AG SER QL: <0.1 INDEX
HBV SURFACE AG SER QL: NEGATIVE
HCV AB SERPL QL IA: NONREACTIVE
HDLC SERPL-MCNC: 72 MG/DL
HDLC SERPL: 3.1 {RATIO} (ref 0–5)
HIV 1+2 AB+HIV1 P24 AG SERPL QL IA: NONREACTIVE
HIV12 RESULT COMMENT, HHIVC: NORMAL
LDLC SERPL CALC-MCNC: 138.8 MG/DL (ref 0–100)
POTASSIUM SERPL-SCNC: 4.3 MMOL/L (ref 3.5–5.1)
PROT SERPL-MCNC: 7.4 G/DL (ref 6.4–8.2)
RPR SER QL: NONREACTIVE
SODIUM SERPL-SCNC: 138 MMOL/L (ref 136–145)
SP1: NORMAL
SP2: NORMAL
SP3: NORMAL
TRIGL SERPL-MCNC: 66 MG/DL (ref ?–150)
TSH SERPL DL<=0.05 MIU/L-ACNC: 0.28 UIU/ML (ref 0.36–3.74)
VLDLC SERPL CALC-MCNC: 13.2 MG/DL

## 2022-03-20 DIAGNOSIS — E03.9 ACQUIRED HYPOTHYROIDISM: ICD-10-CM

## 2022-03-20 RX ORDER — LEVOTHYROXINE SODIUM 125 UG/1
137 TABLET ORAL
Qty: 90 TABLET | Refills: 1 | Status: SHIPPED | OUTPATIENT
Start: 2022-03-20

## 2022-03-20 NOTE — PROGRESS NOTES
Please inform Ms. Gentile that her HIV screening, syphilis hepatitis tests are normal.  Her TSH level is low which means her medication needs to be adjusted (too high of a dose). A lower dose will be submitted to her pharmacy. Additionally her cholesterol is elevated. I recommend to Please work to increase aerobic (exercise) activity,  focus on attaining and maintaining a healthy weight and reduction of intake of simple carbohydrates, especially high-glycemic and high-fructose foods.

## 2022-03-21 ENCOUNTER — TELEPHONE (OUTPATIENT)
Dept: FAMILY MEDICINE CLINIC | Age: 52
End: 2022-03-21

## 2022-03-21 NOTE — TELEPHONE ENCOUNTER
Attempted to call patient. Left voicemail that I was calling about lab results and that a Memeot message with CATIA Briggs's comments would be sent and to call the office back at [phone] with any questions.     ----- Message from Ilda Correa NP sent at 3/20/2022  5:08 PM EDT -----  Please inform Ms. Gentile that her HIV screening, syphilis hepatitis tests are normal.  Her TSH level is low which means her medication needs to be adjusted (too high of a dose). A lower dose will be submitted to her pharmacy. Additionally her cholesterol is elevated. I recommend to Please work to increase aerobic (exercise) activity,  focus on attaining and maintaining a healthy weight and reduction of intake of simple carbohydrates, especially high-glycemic and high-fructose foods.

## 2022-03-24 ENCOUNTER — OFFICE VISIT (OUTPATIENT)
Dept: BEHAVIORAL/MENTAL HEALTH CLINIC | Age: 52
End: 2022-03-24
Payer: COMMERCIAL

## 2022-03-24 VITALS
BODY MASS INDEX: 29.94 KG/M2 | WEIGHT: 175.4 LBS | SYSTOLIC BLOOD PRESSURE: 111 MMHG | RESPIRATION RATE: 17 BRPM | OXYGEN SATURATION: 98 % | DIASTOLIC BLOOD PRESSURE: 75 MMHG | TEMPERATURE: 97.6 F | HEIGHT: 64 IN | HEART RATE: 66 BPM

## 2022-03-24 DIAGNOSIS — F41.1 GENERALIZED ANXIETY DISORDER: ICD-10-CM

## 2022-03-24 DIAGNOSIS — F32.5 MAJOR DEPRESSION IN COMPLETE REMISSION (HCC): ICD-10-CM

## 2022-03-24 PROCEDURE — 99214 OFFICE O/P EST MOD 30 MIN: CPT | Performed by: NURSE PRACTITIONER

## 2022-03-24 RX ORDER — ESCITALOPRAM OXALATE 20 MG/1
TABLET ORAL
Qty: 90 TABLET | Refills: 3 | Status: SHIPPED | OUTPATIENT
Start: 2022-03-24

## 2022-03-24 RX ORDER — BUPROPION HYDROCHLORIDE 300 MG/1
TABLET ORAL
Qty: 90 TABLET | Refills: 3 | Status: SHIPPED | OUTPATIENT
Start: 2022-03-24
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
## 2022-03-24 NOTE — PROGRESS NOTES
Chief Complaint   Patient presents with    Medication Management     Visit Vitals  /75 (BP 1 Location: Left arm, BP Patient Position: Sitting, BP Cuff Size: Adult)   Pulse 66   Temp 97.6 °F (36.4 °C) (Temporal)   Resp 17   Ht 5' 4\" (1.626 m)   Wt 79.6 kg (175 lb 6.4 oz)   SpO2 98%   BMI 30.11 kg/m²     Provider reviewed medication

## 2022-04-08 ENCOUNTER — TRANSCRIBE ORDER (OUTPATIENT)
Dept: SCHEDULING | Age: 52
End: 2022-04-08

## 2022-04-08 DIAGNOSIS — Z12.31 VISIT FOR SCREENING MAMMOGRAM: Primary | ICD-10-CM

## 2022-04-15 ENCOUNTER — HOSPITAL ENCOUNTER (OUTPATIENT)
Dept: MAMMOGRAPHY | Age: 52
Discharge: HOME | End: 2022-04-15
Attending: NURSE PRACTITIONER
Payer: COMMERCIAL

## 2022-04-15 ENCOUNTER — OFFICE VISIT (OUTPATIENT)
Dept: FAMILY MEDICINE CLINIC | Age: 52
End: 2022-04-15
Payer: COMMERCIAL

## 2022-04-15 VITALS
SYSTOLIC BLOOD PRESSURE: 108 MMHG | DIASTOLIC BLOOD PRESSURE: 62 MMHG | BODY MASS INDEX: 29.71 KG/M2 | TEMPERATURE: 97.5 F | RESPIRATION RATE: 16 BRPM | HEIGHT: 64 IN | HEART RATE: 72 BPM | OXYGEN SATURATION: 99 % | WEIGHT: 174 LBS

## 2022-04-15 DIAGNOSIS — Z00.00 HEALTHCARE MAINTENANCE: ICD-10-CM

## 2022-04-15 DIAGNOSIS — Z00.00 WELLNESS EXAMINATION: Primary | ICD-10-CM

## 2022-04-15 DIAGNOSIS — H60.543 DERMATITIS OF EAR CANAL, BILATERAL: ICD-10-CM

## 2022-04-15 DIAGNOSIS — Z12.31 VISIT FOR SCREENING MAMMOGRAM: ICD-10-CM

## 2022-04-15 PROCEDURE — 77067 SCR MAMMO BI INCL CAD: CPT

## 2022-04-15 PROCEDURE — 99396 PREV VISIT EST AGE 40-64: CPT | Performed by: NURSE PRACTITIONER

## 2022-04-15 RX ORDER — NEOMYCIN SULFATE, POLYMYXIN B SULFATE AND HYDROCORTISONE 10; 3.5; 1 MG/ML; MG/ML; [USP'U]/ML
3 SUSPENSION/ DROPS AURICULAR (OTIC) 4 TIMES DAILY
Qty: 10 ML | Refills: 0 | Status: SHIPPED | OUTPATIENT
Start: 2022-04-15

## 2022-04-15 RX ORDER — OFLOXACIN 3 MG/ML
5 SOLUTION AURICULAR (OTIC) DAILY
COMMUNITY
End: 2022-04-15

## 2022-04-15 NOTE — PATIENT INSTRUCTIONS
Colistin/Neomycin/Hydrocortisone (Into the ear)   Colistin (koe-LIS-tin), Hydrocortisone (ngh-oolw-UDI-ti-sone), Neomycin (gps-qf-TIN-sin), Thonzonium Bromide (hhze-RCK-scv-um BROE-mide)  Treats swelling, discomfort, and itching caused by ear infections. Brand Name(s): Coly-Mycin S, Cortisporin TC   There may be other brand names for this medicine. When This Medicine Should Not Be Used: You should not use this medicine if you have had an allergic reaction to any of the ingredients, thimerosal, or similar antibiotics such as gentamicin or tobramycin. You should not use if you have a herpes infection, vaccinia (cowpox), or chickenpox. How to Use This Medicine:   Drop  · Your doctor will tell you how much medicine to use and how often. · You should not use this medicine for more than 10 days (unless your doctor tells you differently). · Wash your hands with soap and water. · Clean and dry the opening of your ear with a Q-Tip®. · Warm the drops by holding the unopened bottle in your hands for a few minutes. · Shake the bottle well before using. · Avoid touching the dropper to your ear or to anything else. · Lie down or tilt your head to the side. Gently pull the earlobe down and back (for children) or up and back (for adults) to straighten the ear canal.  · Drop the prescribed number of drops into the ear. · Keep the ear tilted up for a few minutes or insert a cotton ball into your ear. · Wash your hands afterward to avoid spreading the infection. If a dose is missed:   · Use the missed dose as soon as you remember. · If almost time for the next dose, wait until then to use your medicine and skip the missed dose. · You should not use two doses at the same time. How to Store and Dispose of This Medicine:   · Store at room temperature, away from heat, moisture, and direct light. Do not freeze. · Keep all medicine out of the reach of children.   Drugs and Foods to Avoid:      Ask your doctor or pharmacist before using any other medicine, including over-the-counter medicines, vitamins, and herbal products. Warnings While Using This Medicine:   · Check with your doctor before using this medicine if you have a punctured eardrum or any other ear problems. · If you are pregnant or breastfeeding, talk with your doctor before using this medicine. · If your symptoms do not improve after 7 days or become worse, call your doctor. Possible Side Effects While Using This Medicine:   Call your doctor right away if you notice any of these side effects:  · Skin rash, redness, swelling, or intense itching that were not present before using this medicine  If you notice these less serious side effects, talk with your doctor:   · Stinging or burning in the ear  If you notice other side effects that you think are caused by this medicine, tell your doctor. Call your doctor for medical advice about side effects. You may report side effects to FDA at 7-591-FDA-4949  © 2017 [facility] Information is for End User's use only and may not be sold, redistributed or otherwise used for commercial purposes. The above information is an  only. It is not intended as medical advice for individual conditions or treatments. Talk to your doctor, nurse or pharmacist before following any medical regimen to see if it is safe and effective for you.

## 2022-04-15 NOTE — PROGRESS NOTES
[address] [facility] Note     Chris Ray (: 1970) is a 46 y.o. female, established patient, here for evaluation of the following chief complaint(s):  Complete Physical       ASSESSMENT/PLAN:  1. Wellness examination  -  counseling  - Mammogram completed today. Reviewed. 2. Dermatitis of ear canal, bilateral  -     neomycin-polymyxin-hydrocortisone, buffered, (PEDIOTIC) 3.5-10,000-1 mg/mL-unit/mL-% otic suspension; Administer 3 Drops into each ear four (4) times daily. , Normal, Disp-10 mL, R-0  -     REFERRAL TO ENT-OTOLARYNGOLOGY    3. Healthcare maintenance  - Discussed recommendations for colon cancer screening. Mr. Eri Flores does not currently have someone that is able to accompany her for a colonscopy. We discussed Cologuard. She is considering her options. Return in about 3 months (around 7/15/2022), or if symptoms worsen or fail to improve, for lab check. SUBJECTIVE/OBJECTIVE:    Chris Ray is a 46 y.o. female seen today for physical exam.    Cardiovascular Review:  She has no known cardiovascular conditions. Diet and Lifestyle: generally follows a low fat low cholesterol diet, exercises sporadically, nonsmoker  Home BP Monitoring: is not measured at home. Pertinent ROS: no TIA's, no chest pain on exertion, no dyspnea on exertion, no swelling of ankles, no palpitations. Ears:  Complains of bilateral ear irritation described as \"raw\" and itching. Left > Right. She was treated for otitis externa with ofloxacin otic solution with improvement. Her symptoms have reoccurred and more irritation within the last 24 hours after use of earbuds. Denies denies fever. Denies change in hearing. REVIEW OF SYSTEMS:    Review of Systems   Constitutional: Negative. HENT: Negative for congestion, ear discharge, ear pain, hearing loss, postnasal drip and sore throat. L>R ear irritation and itching     Respiratory: Negative. Cardiovascular: Negative. Gastrointestinal: Negative. Genitourinary: Negative. Musculoskeletal: Negative. Skin: Negative. Neurological: Negative. Psychiatric/Behavioral: Negative. VITAL SIGNS:    Wt Readings from Last 3 Encounters:   04/15/22 174 lb (78.9 kg)   03/24/22 175 lb 6.4 oz (79.6 kg)   03/17/22 173 lb 3.2 oz (78.6 kg)     Temp Readings from Last 3 Encounters:   04/15/22 97.5 °F (36.4 °C) (Temporal)   03/24/22 97.6 °F (36.4 °C) (Temporal)   03/17/22 98 °F (36.7 °C) (Temporal)     BP Readings from Last 3 Encounters:   04/15/22 108/62   03/24/22 111/75   03/17/22 102/69     Pulse Readings from Last 3 Encounters:   04/15/22 72   03/24/22 66   03/17/22 73           PHYSICAL EXAMINATION:       General: Alert, cooperative, no distress  Eyes: Conjunctivae clear. Pupils equally round and reactive to light, Extraocular muscles intact. Ears: bilat EAC's with erythema and flaking, no drainage, TMs normal.  Nose: Nares normal. Septum midline. Mucosa normal. No drainage or sinus tenderness. Mouth/Throat: Lips, mucosa, and tongue normal. No oropharyngeal erythema. No tonsillar enlargement or exudate. Neck: Supple, symmetrical, trachea midline, no adenopathy. No thyroid enlargement/tenderness/nodules  Respiratory: Breathing comfortably, in no acute respiratory distress. Clear to auscultation bilaterally. Normal inspiratory and expiratory ratio. Cardiovascular: Regular rate and rhythm, S1, S2 normal, no murmur, click, rub or gallop. Extremities: no edema. Pulses 2+ and symmetric radial and dorsalis pedis   Abdomen: Soft, non-tender, not distended. Bowel sounds normal. No masses or organomegaly. MSK: Extremities normal appearing, atraumatic, no effusion. Gait steady and unassisted. Skin: Skin color, texture, turgor normal. No rashes or lesions on exposed skin. Lymph nodes: Cervical, supraclavicular nodes normal.  Neurologic: Cranial nerves II-XII intact. Strength 5/5 grossly.  Sensation and reflexes normal throughout. Psychiatric: Normal affect. Mood euthymic. Thoughts logical. Speech volume and speed normal            Treatment risks/benefits/costs/interactions/alternatives discussed with patient. Advised patient to call back or return to office if symptoms worsen/change/persist. If patient cannot reach us or should anything more severe/urgent arise he/she should proceed directly to the nearest emergency department. Discussed expected course/resolution/complications of diagnosis in detail with patient. Patient expressed understanding with the diagnosis and plan. An electronic signature was used to authenticate this note.   -- Ayad Mcwilliams NP

## 2022-04-15 NOTE — LETTER
4/15/2022    Ms. [address] [place] [id number]      Dear Didi Albarran:    Please find your most recent results below. Resulted Orders   DUSTIN MAMMO BI SCREENING INCL CAD    Narrative    STUDY: Bilateral digital screening mammogram    INDICATION:  Screening. COMPARISON: Prior studies dating back to 2012    BREAST COMPOSITION:  The breasts are heterogeneously dense, which may obscure  small masses. FINDINGS: Bilateral digital screening mammography was performed and is  interpreted in conjunction with a computer assisted detection (CAD) system. No  suspicious masses or calcifications are identified. There are bilateral  submuscular saline breast implants. There has been no significant change. Impression    BI-RADS 1: Negative. No mammographic evidence of malignancy. RECOMMENDATIONS:  Next screening mammogram is recommended in one year. The patient will be notified of these results. RECOMMENDATIONS:  Janessa Thomas is a copy of your mammogram for your records. Follow up mammogram is recommended in 1 year.        Please call me if you have any questions: [phone]    Sincerely,      Sima Madrigal NP

## 2022-04-15 NOTE — PROGRESS NOTES
Chief Complaint   Patient presents with    Complete Physical     1. \"Have you been to the ER, urgent care clinic since your last visit? Hospitalized since your last visit? \" no    2. \"Have you seen or consulted any other health care providers outside of the [address] [place] [address] since your last visit? \"  psy     3. For patients aged 39-70: Has the patient had a colonoscopy / FIT/ Cologuard? no      If the patient is female:    4. For patients aged 41-77: Has the patient had a mammogram within the past 2 years? 2022      5. For patients aged 21-65: Has the patient had a pap smear?      2021

## 2022-04-15 NOTE — LETTER
4/15/2022    Ms. [address] [place] [id number]      Dear Madalyn Russell:    Please find your most recent results below. Resulted Orders   DUSTIN MAMMO BI SCREENING INCL CAD    Narrative    STUDY: Bilateral digital screening mammogram    INDICATION:  Screening. COMPARISON: Prior studies dating back to 2012    BREAST COMPOSITION:  The breasts are heterogeneously dense, which may obscure  small masses. FINDINGS: Bilateral digital screening mammography was performed and is  interpreted in conjunction with a computer assisted detection (CAD) system. No  suspicious masses or calcifications are identified. There are bilateral  submuscular saline breast implants. There has been no significant change. Impression    BI-RADS 1: Negative. No mammographic evidence of malignancy. RECOMMENDATIONS:  Next screening mammogram is recommended in one year. The patient will be notified of these results. RECOMMENDATIONS:  Tru Cast is a copy of your mammogram for your records. Follow up mammogram is recommended in 1 year.        Please call me if you have any questions: [phone]    Sincerely,      Sukumar Raza NP

## 2022-08-10 NOTE — PROGRESS NOTES
Annual Visit    Abhishek Kilgore is a 46 y.o. G0 presenting for annual visit. Doing well. No concerns today. Stopped Junel OCPs last fall, regular monthly menses since then, moderate flow, only lasts 2-3 days. Denies hot flashes. Only one partner for the past year, he has vasectomy. Declines STI testing today. They met when she was in her 25s in the [place] and recently reconnected through [employer]. [place] lives in [place] PMH of thyroid dz, migraines, and depression, otherwise healthy. Ob/Gyn Hx:  G0  LMP: 8/2/22  Menses- regular monthly cycles x3-4 days, moderate flow, +cramping  Contraception-partner with vasectomy  STI- HPV+  ? SA-yes    Health maintenance:  Pap- 3/17/2021 NILM HPV-, 2020 NILM HPV neg, 12/19/18 NILM HPV Neg, 8/1/17 NILM HPV+, h/o LEEP in 2004 for high-grade dysplasia  Mammo-4/15/22 B1  Colonoscopy- Denies, has referral    Past Medical History:   Diagnosis Date    Abnormal Papanicolaou smear of cervix     Depression with anxiety 1995    h/o psychiatric hosp 2013 ([place])    Family history of skin cancer     History of migraine     preivoulsy required Botox    Sun-damaged skin     childhood and young adult    Sunburn, blistering     childhood    Tanning bed exposure     Unspecified hypothyroidism        Past Surgical History:   Procedure Laterality Date    HX BREAST AUGMENTATION  2006    bilateral    HX LEEP PROCEDURE  2004    HX UROLOGICAL      urethral dilation 11years of age    IMPLANT BREAST SILICONE/EQ Bilateral     2006 [facility]    DUSTIN MAMMOGRAM DIGITAL BILATERAL  5/13       Family History   Problem Relation Age of Onset    Hypertension Father     Cancer Father         AML    OSTEOARTHRITIS Mother         TKR    Hypertension Mother     Parkinson's Disease Mother     Bleeding Prob Maternal Aunt 70    Breast Cancer Maternal Aunt         46s    Cancer Maternal Grandmother         stomach    Breast Cancer Cousin 39       Social History     Socioeconomic History    Marital status:      Spouse name: . Number of children: 0    Years of education: college    Highest education level: Not on file   Occupational History    Occupation: [employer]      Employer: [employer]   Tobacco Use    Smoking status: Never    Smokeless tobacco: Never   Vaping Use    Vaping Use: Never used   Substance and Sexual Activity    Alcohol use: Yes     Alcohol/week: 1.0 standard drink     Types: 1 Standard drinks or equivalent per week     Comment: 1 drink most weeks    Drug use: No    Sexual activity: Not Currently     Partners: Male     Birth control/protection: Pill   Other Topics Concern     Service Not Asked    Blood Transfusions Not Asked    Caffeine Concern Not Asked    Occupational Exposure Not Asked    Hobby Hazards Not Asked    Sleep Concern Not Asked    Stress Concern Not Asked    Weight Concern Not Asked    Special Diet Not Asked    Back Care Not Asked    Exercise No     Comment: not exercising     Bike Helmet Not Asked    Seat Belt Not Asked    Self-Exams Not Asked   Social History Narrative    Not on file     Social Determinants of Health     Financial Resource Strain: Not on file   Food Insecurity: Not on file   Transportation Needs: Not on file   Physical Activity: Not on file   Stress: Not on file   Social Connections: Not on file   Intimate Partner Violence: Not on file   Housing Stability: Not on file       Current Outpatient Medications   Medication Sig Dispense Refill    neomycin-polymyxin-hydrocortisone, buffered, (PEDIOTIC) 3.5-10,000-1 mg/mL-unit/mL-% otic suspension Administer 3 Drops into each ear four (4) times daily. 10 mL 0    buPROPion XL (WELLBUTRIN XL) 300 mg XL tablet TAKE 1 TABLET EVERY MORNING 90 Tablet 3    escitalopram oxalate (LEXAPRO) 20 mg tablet TAKE 1 TABLET DAILY 90 Tablet 3    levothyroxine (SYNTHROID) 125 mcg tablet Take 1 Tablet by mouth Daily (before breakfast).  90 Tablet 1       Allergies   Allergen Reactions    Pcn [Penicillins] Rash Review of Systems - History obtained from the patient  Constitutional: negative for weight loss, fever, night sweats  HEENT: negative for hearing loss, earache, congestion, snoring, sorethroat  CV: negative for chest pain, palpitations, edema  Resp: negative for cough, shortness of breath, wheezing  GI: negative for change in bowel habits, abdominal pain, black or bloody stools  : negative for frequency, dysuria, hematuria, vaginal discharge  MSK: negative for back pain, joint pain, muscle pain  Breast: negative for breast lumps, nipple discharge, galactorrhea  Skin :negative for itching, rash, hives  Neuro: negative for dizziness, headache, confusion, weakness  Psych: negative for anxiety, depression, change in mood  Heme/lymph: negative for bleeding, bruising, pallor    Physical Exam  There were no vitals taken for this visit. PHYSICAL EXAMINATION    Constitutional  Appearance: well-nourished, well developed, alert, in no acute distress    HENT  Head and Face: appears normal    Neck  Inspection/Palpation: normal appearance, no masses or tenderness  Lymph Nodes: no lymphadenopathy present  Thyroid: gland size normal, nontender, no nodules or masses present on palpation    Chest  Respiratory Effort: breathing non-labored  Auscultation: normal breath sounds    Cardiovascular  Heart:   Auscultation: regular rate and rhythm without murmur    Breasts  Inspection of Breasts: breasts symmetrical, no skin changes, no discharge present, nipple appearance normal, no skin retraction present  Palpation of Breasts and Axillae: no masses present on palpation, no breast tenderness  Axillary Lymph Nodes: no lymphadenopathy present    Gastrointestinal  Abdominal Examination: abdomen non-tender to palpation, normal bowel sounds, no masses present  Liver and spleen: no hepatomegaly present, spleen not palpable  Hernias: no hernias identified    Genitourinary  External Genitalia: normal appearance for age, no discharge present, no tenderness present, no inflammatory lesions present, no masses present, no atrophy present  Vagina: normal vaginal vault without central or paravaginal defects, no discharge present, no inflammatory lesions present, no masses present  Bladder: non-tender to palpation  Urethra: appears normal  Cervix: normal   Uterus: normal size, shape and consistency  Adnexa: no adnexal tenderness present, no adnexal masses present  Perineum: perineum within normal limits, no evidence of trauma, no rashes or skin lesions present  Anus: anus within normal limits, no hemorrhoids present  Inguinal Lymph Nodes: no lymphadenopathy present    Skin  General Inspection: no rash, no lesions identified    Neurologic/Psychiatric  Mental Status:  Orientation: grossly oriented to person, place and time  Mood and Affect: mood normal, affect appropriate      Assessment/Plan:  46 y.o. G0 presenting for annual exam.    Annual exam:  -diet/exercies/lifestyle counseling  -pap/HPV today  -STI testing declined  -mammo utd, repeat 1 year  -colonoscopy to be scheduled (referred by PCP)  -off OCPs, menstrual calendar, monitor for signs of menopause    RTC: 1 year for AE or sooner jesus Clarke MD  8/12/2022  2:20 PM

## 2022-08-12 ENCOUNTER — OFFICE VISIT (OUTPATIENT)
Dept: OBGYN CLINIC | Age: 52
End: 2022-08-12
Payer: COMMERCIAL

## 2022-08-12 DIAGNOSIS — Z01.419 WELL WOMAN EXAM: Primary | ICD-10-CM

## 2022-08-12 DIAGNOSIS — Z11.51 SCREENING FOR HUMAN PAPILLOMAVIRUS: ICD-10-CM

## 2022-08-12 PROCEDURE — 99396 PREV VISIT EST AGE 40-64: CPT | Performed by: OBSTETRICS & GYNECOLOGY

## 2022-08-17 LAB
CYTOLOGIST CVX/VAG CYTO: ABNORMAL
CYTOLOGY CVX/VAG DOC CYTO: ABNORMAL
CYTOLOGY CVX/VAG DOC THIN PREP: NORMAL
DX ICD CODE: ABNORMAL
DX ICD CODE: ABNORMAL
HPV I/H RISK 4 DNA CVX QL PROBE+SIG AMP: NEGATIVE
Lab: NORMAL
OTHER STN SPEC: ABNORMAL
PATHOLOGIST CVX/VAG CYTO: ABNORMAL
STAT OF ADQ CVX/VAG CYTO-IMP: ABNORMAL

## 2022-09-23 ENCOUNTER — VIRTUAL VISIT (OUTPATIENT)
Dept: BEHAVIORAL/MENTAL HEALTH CLINIC | Age: 52
End: 2022-09-23
Payer: COMMERCIAL

## 2022-09-23 DIAGNOSIS — F41.1 GENERALIZED ANXIETY DISORDER: ICD-10-CM

## 2022-09-23 DIAGNOSIS — F33.42 RECURRENT MAJOR DEPRESSIVE DISORDER, IN FULL REMISSION (HCC): Primary | ICD-10-CM

## 2022-09-23 PROCEDURE — 99214 OFFICE O/P EST MOD 30 MIN: CPT | Performed by: NURSE PRACTITIONER

## 2022-09-23 NOTE — PROGRESS NOTES
CHIEF COMPLAINT:  Isaiah Ventura is a 46 y.o. female and was seen today for follow-up of psychiatric condition and psychotropic medication management. HPI:    Esequiel Carver reports the following psychiatric symptoms by hx:  depression and anxiety. Overall symptoms have been present for years. Currently symptoms are of moderate severity. The symptoms occur intermediately. Pt reports medications are effective. Met with pt for appt today to review current treatment plan. FAMILY/SOCIAL HX:   Psychosocial Stressor      REVIEW OF SYSTEMS:  Psychiatric symptoms being monitored for:  depression, anxiety   Appetite:good   Sleep: good   Neuro: denies   Cardio: denies     There were no vitals taken for this visit. Side Effects:  none    MENTAL STATUS EXAM:   Sensorium  oriented to time, place and person   Relations cooperative   Appearance:  age appropriate and within normal Limits   Motor Behavior:  within normal limits   Speech:  normal pitch and normal volume   Thought Process: within normal limits   Thought Content free of delusions and free of hallucinations   Suicidal ideations none   Homicidal ideations none   Mood:  within normal limits   Affect:  euthymic   Memory recent  adequate   Memory remote:  adequate   Concentration:  adequate   Abstraction:  abstract   Insight:  good   Reliability good   Judgment:  good     MEDICAL DECISION MAKING:  Problems addressed today:    ICD-10-CM ICD-9-CM    1. Recurrent major depressive disorder, [facility]  F33.42 296.36       2. Generalized anxiety disorder  F41.1 300.02               Assessment:   Esequiel Carver is responding to treatment. Symptoms are improved and mostly stable. She is having some increased stressors. Mother is having come health concerns. Discussed current medications and dosages. No changes made today. Reinforced positive coping strategies. Reviewed treatment goals and target symptoms to monitor for. Plan:   1.     Current Outpatient Medications Medication Sig Dispense Refill    neomycin-polymyxin-hydrocortisone, buffered, (PEDIOTIC) 3.5-10,000-1 mg/mL-unit/mL-% otic suspension Administer 3 Drops into each ear four (4) times daily. (Patient not taking: Reported on 8/12/2022) 10 mL 0    buPROPion XL (WELLBUTRIN XL) 300 mg XL tablet TAKE 1 TABLET EVERY MORNING 90 Tablet 3    escitalopram oxalate (LEXAPRO) 20 mg tablet TAKE 1 TABLET DAILY 90 Tablet 3    levothyroxine (SYNTHROID) 125 mcg tablet Take 1 Tablet by mouth Daily (before breakfast). 90 Tablet 1          medication changes made today: Continue Wellbutrin 300 mg XL po daily, continue Lexapro 20 po daily     2. Counseling and coordination of care including instructions for treatment, risks/benefits, risk factor reduction and patient/family education. She agrees with the plan. Patient instructed to call with any side effects, questions or issues. Sophie Heards, was evaluated through a synchronous (real-time) audio-video encounter. The patient (or guardian if applicable) is aware that this is a billable service, which includes applicable co-pays. This Virtual Visit was conducted with patient's (and/or legal guardian's) consent. The visit was conducted pursuant to the emergency declaration under the [address] [place] [address], [address] [place] [address] authority and the [employer] and [employer] Act. Patient identification was verified, and a caregiver was present when appropriate. The patient was located at: Home: [address] [facility] [address]  The provider was located at: Facility (Appt Department): [address]       An electronic signature was used to authenticate this note.   -- Nancy Goodwin NP          9/23/2022

## 2023-03-03 ENCOUNTER — OFFICE VISIT (OUTPATIENT)
Dept: URGENT CARE | Age: 53
End: 2023-03-03

## 2023-03-03 ENCOUNTER — NURSE TRIAGE (OUTPATIENT)
Dept: OTHER | Facility: CLINIC | Age: 53
End: 2023-03-03

## 2023-03-03 VITALS
HEIGHT: 64 IN | OXYGEN SATURATION: 99 % | DIASTOLIC BLOOD PRESSURE: 79 MMHG | TEMPERATURE: 99 F | RESPIRATION RATE: 18 BRPM | BODY MASS INDEX: 30.22 KG/M2 | SYSTOLIC BLOOD PRESSURE: 130 MMHG | HEART RATE: 76 BPM | WEIGHT: 177 LBS
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
## 2023-03-03 DIAGNOSIS — M25.552 ACUTE PAIN OF LEFT HIP: ICD-10-CM

## 2023-03-03 DIAGNOSIS — R42 DIZZINESS: ICD-10-CM

## 2023-03-03 DIAGNOSIS — M25.512 ACUTE PAIN OF LEFT SHOULDER: ICD-10-CM

## 2023-03-03 NOTE — PROGRESS NOTES
HISTORY OF PRESENT ILLNESS  Isaías Johns is a 48 y.o. female. Patient was seen after she fell at work. Reports that she went down on her left hip and shoulder. This happen about 5 hours ago. Pain is minimal and took OTC to help. Sure she did not hit her head but has some dizziness. No weakness or facial drop. Was slow in speaking but this has improved. No visual changes. Shoulder Injury   Pertinent negatives include no tingling. Visit Vitals  /79   Pulse 76   Temp 99 °F (37.2 °C)   Resp 18   Ht 5' 4\" (1.626 m)   Wt 177 lb (80.3 kg)   SpO2 99%   BMI 30.38 kg/m²         Past Medical History:   Diagnosis Date    Abnormal Papanicolaou smear of cervix     Depression with anxiety 1995    h/o psychiatric hosp 2013 ()    Family history of skin cancer     History of migraine     preivoulsy required Botox    Sun-damaged skin     childhood and young adult    Sunburn, blistering     childhood    Tanning bed exposure     Unspecified hypothyroidism      Past Surgical History:   Procedure Laterality Date    HX BREAST AUGMENTATION  2006    bilateral    HX LEEP PROCEDURE  2004    HX UROLOGICAL      urethral dilation 11years of age    IMPLANT BREAST SILICONE/EQ Bilateral     2006 [facility]    DUSTIN MAMMOGRAM DIGITAL BILATERAL  5/13     Family History   Problem Relation Age of Onset    Hypertension Father     Cancer Father         AML    OSTEOARTHRITIS Mother         TKR    Hypertension Mother     Parkinson's Disease Mother     Bleeding Prob Maternal Aunt 70    Breast Cancer Maternal Aunt         46s    Cancer Maternal Grandmother         stomach    Breast Cancer Cousin 36     Outpatient Encounter Medications as of 3/3/2023   Medication Sig Dispense Refill    neomycin-polymyxin-hydrocortisone, buffered, (PEDIOTIC) 3.5-10,000-1 mg/mL-unit/mL-% otic suspension Administer 3 Drops into each ear four (4) times daily.  (Patient not taking: Reported on 8/12/2022) 10 mL 0    buPROPion XL (WELLBUTRIN XL) 300 mg XL tablet TAKE 1 TABLET EVERY MORNING 90 Tablet 3    escitalopram oxalate (LEXAPRO) 20 mg tablet TAKE 1 TABLET DAILY 90 Tablet 3    levothyroxine (SYNTHROID) 125 mcg tablet Take 1 Tablet by mouth Daily (before breakfast). 90 Tablet 1     No facility-administered encounter medications on file as of 3/3/2023. Review of Systems   Constitutional:  Negative for chills and fever. Respiratory: Negative. Cardiovascular: Negative. Musculoskeletal:  Positive for back pain, falls and joint pain. Neurological:  Positive for dizziness. Negative for tingling and sensory change. Physical Exam  Vitals and nursing note reviewed. Constitutional:       General: She is not in acute distress. HENT:      Head: Normocephalic and atraumatic. Right Ear: Tympanic membrane normal.      Left Ear: Tympanic membrane normal.      Mouth/Throat:      Pharynx: Oropharynx is clear. Eyes:      Extraocular Movements: Extraocular movements intact. Pupils: Pupils are equal, round, and reactive to light. Cardiovascular:      Rate and Rhythm: Normal rate and regular rhythm. Pulmonary:      Effort: Pulmonary effort is normal.      Breath sounds: Normal breath sounds. Musculoskeletal:      Left shoulder: No swelling or tenderness. Normal range of motion. Normal strength. Normal pulse. Arms:       Lumbar back: Normal.      Left hip: No bony tenderness. Normal range of motion. Normal strength. Comments: Minimal pain with extension, able to squat     Skin:     General: Skin is warm. Neurological:      Mental Status: She is alert and oriented to person, place, and time. Cranial Nerves: No cranial nerve deficit. Sensory: No sensory deficit. Motor: No weakness. Gait: Gait normal.       ASSESSMENT and PLAN  Diagnoses and all orders for this visit:    1. Fall, initial encounter    2. Acute pain of left hip    3. Acute pain of left shoulder    4.  Dizziness    Encourage patient to use ICE and heat intermittently. Also the use of motrin 800 mg every 8 hours as needed. Encourage ROM as tolerated. Is the dizziness occurs to return or go back to ER. Push fluids and move slowly from positions.    Follow-up and Dispositions    Return if symptoms worsen or fail to improve.       lab results and schedule of future lab studies reviewed with patient  reviewed diet, exercise and weight control  reviewed medications and side effects in detail

## 2023-03-03 NOTE — TELEPHONE ENCOUNTER
Location of patient: [address] [facility]  call from Angela Canseco at [facility] with [employer]. Subjective: Caller states \"I was walking in a hallway, there is moving going on, there are big plastic floor coverings, one of them was not secure, it flew out from underneath me. I landed on my knee. \"     Current Symptoms: pain to left knee, left shoulder, left hip, left back pain, left neck pain    Onset: 1 hour ago;     Pain Severity: 6/10; Temperature: denies     Denies - hitting head or LOC    Recommended disposition: Go to Office Now    Care advice provided, patient verbalizes understanding; denies any other questions or concerns; instructed to call back for any new or worsening symptoms. Patient/Caller agrees with recommended disposition; writer provided warm transfer to [place] at [facility] for appointment scheduling    Attention Provider: Thank you for allowing me to participate in the care of your patient. The patient was connected to triage in response to information provided to the [facility]. Please do not respond through this encounter as the response is not directed to a shared pool. Reason for Disposition   MODERATE weakness (i.e., interferes with work, school, normal activities) and new-onset or worsening    Protocols used:  Falls and Falling-ADULT-[place]

## 2023-05-09 ENCOUNTER — PATIENT MESSAGE (OUTPATIENT)
Age: 53
End: 2023-05-09

## 2023-05-09 RX ORDER — LEVOTHYROXINE SODIUM 0.12 MG/1
125 TABLET ORAL
Qty: 30 TABLET | Refills: 0 | Status: SHIPPED | OUTPATIENT
Start: 2023-05-09

## 2023-05-24 ENCOUNTER — OFFICE VISIT (OUTPATIENT)
Age: 53
End: 2023-05-24
Payer: COMMERCIAL

## 2023-05-24 ENCOUNTER — HOSPITAL ENCOUNTER (OUTPATIENT)
Facility: HOSPITAL | Age: 53
Discharge: HOME | End: 2023-05-27
Payer: COMMERCIAL

## 2023-05-24 VITALS
HEIGHT: 64 IN | HEART RATE: 101 BPM | WEIGHT: 180.6 LBS | BODY MASS INDEX: 30.83 KG/M2 | OXYGEN SATURATION: 97 % | TEMPERATURE: 98.7 F | SYSTOLIC BLOOD PRESSURE: 113 MMHG | DIASTOLIC BLOOD PRESSURE: 77 MMHG | RESPIRATION RATE: 16 BRPM

## 2023-05-24 DIAGNOSIS — Z00.00 ENCOUNTER FOR WELL ADULT EXAM WITHOUT ABNORMAL FINDINGS: Primary | ICD-10-CM

## 2023-05-24 DIAGNOSIS — Z12.31 SCREENING MAMMOGRAM FOR HIGH-RISK PATIENT: ICD-10-CM

## 2023-05-24 DIAGNOSIS — E03.9 HYPOTHYROIDISM, UNSPECIFIED TYPE: ICD-10-CM

## 2023-05-24 PROCEDURE — 99396 PREV VISIT EST AGE 40-64: CPT | Performed by: NURSE PRACTITIONER

## 2023-05-24 PROCEDURE — 77067 SCR MAMMO BI INCL CAD: CPT

## 2023-05-24 RX ORDER — NEOMYCIN SULFATE, POLYMYXIN B SULFATE AND HYDROCORTISONE 10; 3.5; 1 MG/ML; MG/ML; [USP'U]/ML
3 SUSPENSION/ DROPS AURICULAR (OTIC) 4 TIMES DAILY
Qty: 10 ML | Refills: 0 | Status: SHIPPED | OUTPATIENT
Start: 2023-05-24

## 2023-05-24 SDOH — ECONOMIC STABILITY: HOUSING INSECURITY
IN THE LAST 12 MONTHS, WAS THERE A TIME WHEN YOU DID NOT HAVE A STEADY PLACE TO SLEEP OR SLEPT IN A SHELTER (INCLUDING NOW)?: NO

## 2023-05-24 SDOH — ECONOMIC STABILITY: FOOD INSECURITY: WITHIN THE PAST 12 MONTHS, YOU WORRIED THAT YOUR FOOD WOULD RUN OUT BEFORE YOU GOT MONEY TO BUY MORE.: NEVER TRUE

## 2023-05-24 SDOH — ECONOMIC STABILITY: FOOD INSECURITY: WITHIN THE PAST 12 MONTHS, THE FOOD YOU BOUGHT JUST DIDN'T LAST AND YOU DIDN'T HAVE MONEY TO GET MORE.: NEVER TRUE

## 2023-05-24 SDOH — ECONOMIC STABILITY: INCOME INSECURITY: HOW HARD IS IT FOR YOU TO PAY FOR THE VERY BASICS LIKE FOOD, HOUSING, MEDICAL CARE, AND HEATING?: NOT VERY HARD

## 2023-05-24 ASSESSMENT — PATIENT HEALTH QUESTIONNAIRE - PHQ9
6. FEELING BAD ABOUT YOURSELF - OR THAT YOU ARE A FAILURE OR HAVE LET YOURSELF OR YOUR FAMILY DOWN: 3
SUM OF ALL RESPONSES TO PHQ QUESTIONS 1-9: 7
SUM OF ALL RESPONSES TO PHQ9 QUESTIONS 1 & 2: 1
2. FEELING DOWN, DEPRESSED OR HOPELESS: 1
5. POOR APPETITE OR OVEREATING: 0
SUM OF ALL RESPONSES TO PHQ QUESTIONS 1-9: 7
8. MOVING OR SPEAKING SO SLOWLY THAT OTHER PEOPLE COULD HAVE NOTICED. OR THE OPPOSITE, BEING SO FIGETY OR RESTLESS THAT YOU HAVE BEEN MOVING AROUND A LOT MORE THAN USUAL: 0
3. TROUBLE FALLING OR STAYING ASLEEP: 0
SUM OF ALL RESPONSES TO PHQ QUESTIONS 1-9: 7
9. THOUGHTS THAT YOU WOULD BE BETTER OFF DEAD, OR OF HURTING YOURSELF: 0
1. LITTLE INTEREST OR PLEASURE IN DOING THINGS: 0
4. FEELING TIRED OR HAVING LITTLE ENERGY: 3
SUM OF ALL RESPONSES TO PHQ QUESTIONS 1-9: 7
7. TROUBLE CONCENTRATING ON THINGS, SUCH AS READING THE NEWSPAPER OR WATCHING TELEVISION: 0
10. IF YOU CHECKED OFF ANY PROBLEMS, HOW DIFFICULT HAVE THESE PROBLEMS MADE IT FOR YOU TO DO YOUR WORK, TAKE CARE OF THINGS AT HOME, OR GET ALONG WITH OTHER PEOPLE: 1

## 2023-05-25 DIAGNOSIS — Z12.11 COLON CANCER SCREENING: Primary | ICD-10-CM

## 2023-05-25 LAB
ALBUMIN SERPL-MCNC: 3.9 G/DL (ref 3.5–5)
ALBUMIN/GLOB SERPL: 1.1 (ref 1.1–2.2)
ALP SERPL-CCNC: 73 U/L (ref 45–117)
ALT SERPL-CCNC: 24 U/L (ref 12–78)
ANION GAP SERPL CALC-SCNC: 4 MMOL/L (ref 5–15)
AST SERPL-CCNC: 16 U/L (ref 15–37)
BILIRUB SERPL-MCNC: 0.2 MG/DL (ref 0.2–1)
BUN SERPL-MCNC: 13 MG/DL (ref 6–20)
BUN/CREAT SERPL: 13 (ref 12–20)
CALCIUM SERPL-MCNC: 9 MG/DL (ref 8.5–10.1)
CHLORIDE SERPL-SCNC: 106 MMOL/L (ref 97–108)
CO2 SERPL-SCNC: 28 MMOL/L (ref 21–32)
CREAT SERPL-MCNC: 1.04 MG/DL (ref 0.55–1.02)
GLOBULIN SER CALC-MCNC: 3.6 G/DL (ref 2–4)
GLUCOSE SERPL-MCNC: 128 MG/DL (ref 65–100)
POTASSIUM SERPL-SCNC: 4 MMOL/L (ref 3.5–5.1)
PROT SERPL-MCNC: 7.5 G/DL (ref 6.4–8.2)
SODIUM SERPL-SCNC: 138 MMOL/L (ref 136–145)
T4 FREE SERPL-MCNC: 0.9 NG/DL (ref 0.8–1.5)
TSH SERPL DL<=0.05 MIU/L-ACNC: 1.85 UIU/ML (ref 0.36–3.74)

## 2023-05-31 RX ORDER — LEVOTHYROXINE SODIUM 0.12 MG/1
125 TABLET ORAL
Qty: 90 TABLET | Refills: 2 | Status: SHIPPED | OUTPATIENT
Start: 2023-05-31

## 2023-06-05 RX ORDER — BUPROPION HYDROCHLORIDE 300 MG/1
TABLET ORAL
Qty: 30 TABLET | Refills: 0 | Status: SHIPPED | OUTPATIENT
Start: 2023-06-05 | End: 2023-06-27 | Stop reason: SDUPTHER

## 2023-06-05 RX ORDER — ESCITALOPRAM OXALATE 20 MG/1
TABLET ORAL
Qty: 30 TABLET | Refills: 0 | Status: SHIPPED | OUTPATIENT
Start: 2023-06-05 | End: 2023-06-27 | Stop reason: SDUPTHER

## 2023-06-27 ENCOUNTER — TELEMEDICINE (OUTPATIENT)
Age: 53
End: 2023-06-27
Payer: COMMERCIAL

## 2023-06-27 DIAGNOSIS — F33.42 MAJOR DEPRESSIVE DISORDER, RECURRENT, IN FULL REMISSION (HCC): Primary | ICD-10-CM

## 2023-06-27 DIAGNOSIS — F41.1 GENERALIZED ANXIETY DISORDER: ICD-10-CM

## 2023-06-27 PROCEDURE — 99214 OFFICE O/P EST MOD 30 MIN: CPT | Performed by: NURSE PRACTITIONER

## 2023-06-27 RX ORDER — BUPROPION HYDROCHLORIDE 300 MG/1
300 TABLET ORAL EVERY MORNING
Qty: 90 TABLET | Refills: 3 | Status: SHIPPED | OUTPATIENT
Start: 2023-06-27

## 2023-06-27 RX ORDER — ESCITALOPRAM OXALATE 20 MG/1
20 TABLET ORAL DAILY
Qty: 90 TABLET | Refills: 3 | Status: SHIPPED | OUTPATIENT
Start: 2023-06-27

## 2023-12-06 ENCOUNTER — OFFICE VISIT (OUTPATIENT)
Age: 53
End: 2023-12-06
Payer: COMMERCIAL

## 2023-12-06 VITALS
BODY MASS INDEX: 29.64 KG/M2 | HEIGHT: 64 IN | TEMPERATURE: 97.4 F | OXYGEN SATURATION: 97 % | RESPIRATION RATE: 17 BRPM | WEIGHT: 173.6 LBS | DIASTOLIC BLOOD PRESSURE: 76 MMHG | SYSTOLIC BLOOD PRESSURE: 117 MMHG | HEART RATE: 71 BPM

## 2023-12-06 DIAGNOSIS — F41.1 GENERALIZED ANXIETY DISORDER: ICD-10-CM

## 2023-12-06 DIAGNOSIS — F33.0 MILD EPISODE OF RECURRENT MAJOR DEPRESSIVE DISORDER (HCC): Primary | ICD-10-CM

## 2023-12-06 PROCEDURE — 99214 OFFICE O/P EST MOD 30 MIN: CPT | Performed by: NURSE PRACTITIONER

## 2023-12-06 RX ORDER — BUPROPION HYDROCHLORIDE 300 MG/1
300 TABLET ORAL EVERY MORNING
Qty: 90 TABLET | Refills: 3 | Status: SHIPPED | OUTPATIENT
Start: 2024-03-28

## 2023-12-06 RX ORDER — ESCITALOPRAM OXALATE 20 MG/1
20 TABLET ORAL DAILY
Qty: 90 TABLET | Refills: 3 | Status: SHIPPED | OUTPATIENT
Start: 2024-03-28

## 2023-12-06 SDOH — ECONOMIC STABILITY: HOUSING INSECURITY: IN THE LAST 12 MONTHS, HOW MANY PLACES HAVE YOU LIVED?: 1

## 2023-12-06 SDOH — ECONOMIC STABILITY: FOOD INSECURITY: WITHIN THE PAST 12 MONTHS, THE FOOD YOU BOUGHT JUST DIDN'T LAST AND YOU DIDN'T HAVE MONEY TO GET MORE.: NEVER TRUE

## 2023-12-06 SDOH — ECONOMIC STABILITY: TRANSPORTATION INSECURITY
IN THE PAST 12 MONTHS, HAS THE LACK OF TRANSPORTATION KEPT YOU FROM MEDICAL APPOINTMENTS OR FROM GETTING MEDICATIONS?: NO

## 2023-12-06 SDOH — ECONOMIC STABILITY: FOOD INSECURITY: WITHIN THE PAST 12 MONTHS, YOU WORRIED THAT YOUR FOOD WOULD RUN OUT BEFORE YOU GOT MONEY TO BUY MORE.: NEVER TRUE

## 2023-12-06 SDOH — ECONOMIC STABILITY: INCOME INSECURITY: IN THE LAST 12 MONTHS, WAS THERE A TIME WHEN YOU WERE NOT ABLE TO PAY THE MORTGAGE OR RENT ON TIME?: NO

## 2023-12-06 SDOH — ECONOMIC STABILITY: TRANSPORTATION INSECURITY
IN THE PAST 12 MONTHS, HAS LACK OF TRANSPORTATION KEPT YOU FROM MEETINGS, WORK, OR FROM GETTING THINGS NEEDED FOR DAILY LIVING?: NO

## 2023-12-06 ASSESSMENT — ANXIETY QUESTIONNAIRES
7. FEELING AFRAID AS IF SOMETHING AWFUL MIGHT HAPPEN: 0
3. WORRYING TOO MUCH ABOUT DIFFERENT THINGS: 0
2. NOT BEING ABLE TO STOP OR CONTROL WORRYING: 2
IF YOU CHECKED OFF ANY PROBLEMS ON THIS QUESTIONNAIRE, HOW DIFFICULT HAVE THESE PROBLEMS MADE IT FOR YOU TO DO YOUR WORK, TAKE CARE OF THINGS AT HOME, OR GET ALONG WITH OTHER PEOPLE: SOMEWHAT DIFFICULT
1. FEELING NERVOUS, ANXIOUS, OR ON EDGE: 1
GAD7 TOTAL SCORE: 4
5. BEING SO RESTLESS THAT IT IS HARD TO SIT STILL: 0
6. BECOMING EASILY ANNOYED OR IRRITABLE: 0
4. TROUBLE RELAXING: 1

## 2023-12-06 ASSESSMENT — PATIENT HEALTH QUESTIONNAIRE - PHQ9
10. IF YOU CHECKED OFF ANY PROBLEMS, HOW DIFFICULT HAVE THESE PROBLEMS MADE IT FOR YOU TO DO YOUR WORK, TAKE CARE OF THINGS AT HOME, OR GET ALONG WITH OTHER PEOPLE: 1
1. LITTLE INTEREST OR PLEASURE IN DOING THINGS: 1
SUM OF ALL RESPONSES TO PHQ QUESTIONS 1-9: 8
SUM OF ALL RESPONSES TO PHQ QUESTIONS 1-9: 8
2. FEELING DOWN, DEPRESSED OR HOPELESS: 1
8. MOVING OR SPEAKING SO SLOWLY THAT OTHER PEOPLE COULD HAVE NOTICED. OR THE OPPOSITE, BEING SO FIGETY OR RESTLESS THAT YOU HAVE BEEN MOVING AROUND A LOT MORE THAN USUAL: 0
3. TROUBLE FALLING OR STAYING ASLEEP: 0
6. FEELING BAD ABOUT YOURSELF - OR THAT YOU ARE A FAILURE OR HAVE LET YOURSELF OR YOUR FAMILY DOWN: 3
9. THOUGHTS THAT YOU WOULD BE BETTER OFF DEAD, OR OF HURTING YOURSELF: 0
SUM OF ALL RESPONSES TO PHQ QUESTIONS 1-9: 8
4. FEELING TIRED OR HAVING LITTLE ENERGY: 3
5. POOR APPETITE OR OVEREATING: 0
7. TROUBLE CONCENTRATING ON THINGS, SUCH AS READING THE NEWSPAPER OR WATCHING TELEVISION: 0
SUM OF ALL RESPONSES TO PHQ QUESTIONS 1-9: 8
SUM OF ALL RESPONSES TO PHQ9 QUESTIONS 1 & 2: 2

## 2023-12-06 ASSESSMENT — LIFESTYLE VARIABLES
HOW OFTEN DO YOU HAVE A DRINK CONTAINING ALCOHOL: 2-4 TIMES A MONTH
HOW MANY STANDARD DRINKS CONTAINING ALCOHOL DO YOU HAVE ON A TYPICAL DAY: 1 OR 2

## 2023-12-06 ASSESSMENT — SOCIAL DETERMINANTS OF HEALTH (SDOH): HOW HARD IS IT FOR YOU TO PAY FOR THE VERY BASICS LIKE FOOD, HOUSING, MEDICAL CARE, AND HEATING?: NOT VERY HARD

## 2023-12-06 NOTE — PROGRESS NOTES
CHIEF COMPLAINT:  Rdaha Mays is a 48 y.o. female, , with a boyfriend Gracie Ochoa, from [facility] x 2 12/ yrs- but met in the [place] when they were in their 19's) without children and is domiciled independently with her 2 cats (Rafael Paget). Today she is being seen for a scheduled follow-up appointment to discuss symptom management associated with the historical diagnoses of  MDD, recurrent, in full remission and ROSSY with psychotropic medication management. *Last in office 6/27/23- No med changes. HPI:    (12/6/23) Orly Kerr reports the following psychiatric symptoms by hx:  depression and anxiety. Overall symptoms have been present for years. Currently symptoms are of moderate severity. The symptoms occur intermediately. Pt reports medications are effective. Met with pt via telehealth video for appt today to review current treatment plan. Orly Kerr is looking forward to the holidays- BF, his sister, and family members. Pt is having increased concerns for her mother (Parkinson's with some delusion)- Orly Kerr is trying to secure providers her her mom now that she will predominately live in [facility] Psychiatry materials provided). SLEEP- \"real good, not a problem. \"  APPETITE  can change depending upon mood and is a significant d/t Anorexia in her 20'3- \"I use food as a weapon. \" Notes ability to identify changes in octavia and force self to eat or decrease intake to manage wt  SI- Denies, \"Oh no, not since 2013. I think of my cat and BF\" No family hx of suicide. SELF HARM- denies recent or current; hx in her late teens (food, hitting, beating myself)  SUBSTANCES- occasional alcohol (wine) 1-2 glasses wine, a week or every other week. PHQ-9 score is 8 indicating mild Depression . GAD7- score is 14 indicating Moderate Anxiety  Mood Disorder Questionnaire is Document results of questions A, B, & C:  Question (a) is positive with 7 or more sub-questions answered \"Yes. \",Question (b) is positive with an

## 2024-03-01 RX ORDER — LEVOTHYROXINE SODIUM 0.12 MG/1
125 TABLET ORAL
Qty: 90 TABLET | Refills: 0 | Status: SHIPPED | OUTPATIENT
Start: 2024-03-01

## 2024-03-01 NOTE — TELEPHONE ENCOUNTER
PCP: Fatoumata Batista, SABINO - NP    Last appt: 5/24/2023   Future Appointments   Date Time Provider Department Center   6/6/2024  9:30 AM [facility] - [facility]       Requested Prescriptions     Pending Prescriptions Disp Refills    levothyroxine (SYNTHROID) 125 MCG tablet [Pharmacy Med Name: LEVOTHYROXINE 125 MCG TABLET] 90 tablet 2     Sig: TAKE 1 TABLET BY MOUTH EVERY DAY BEFORE BREAKFAST         Prior labs and Blood pressures:  BP Readings from Last 3 Encounters:   05/24/23 113/77   03/03/23 130/79   04/15/22 108/62     Lab Results   Component Value Date/Time     05/24/2023 03:55 PM    K 4.0 05/24/2023 03:55 PM     05/24/2023 03:55 PM    CO2 28 05/24/2023 03:55 PM    BUN 13 05/24/2023 03:55 PM    GFRAA >60 03/17/2022 09:28 AM     No results found for: \"HBA1C\", \"[id number]\"  Lab Results   Component Value Date/Time    CHOL 224 03/17/2022 09:28 AM    HDL 72 03/17/2022 09:28 AM     No results found for: \"VITD3\", \"VD3RIA\"    Lab Results   Component Value Date/Time    TSH 0.28 03/17/2022 09:28 AM

## 2024-03-01 NOTE — TELEPHONE ENCOUNTER
Left voicemail to callback when she calls back please let her know that MANUEL Batista had approved her Synthroid.However MANUEL Batista need's her to make an In Office Appt.

## 2024-05-24 ENCOUNTER — OFFICE VISIT (OUTPATIENT)
Age: 54
End: 2024-05-24

## 2024-05-24 VITALS
OXYGEN SATURATION: 98 % | RESPIRATION RATE: 16 BRPM | BODY MASS INDEX: 30.11 KG/M2 | SYSTOLIC BLOOD PRESSURE: 122 MMHG | DIASTOLIC BLOOD PRESSURE: 82 MMHG | HEIGHT: 64 IN | TEMPERATURE: 97.3 F | HEART RATE: 71 BPM | WEIGHT: 176.4 LBS

## 2024-05-24 DIAGNOSIS — G89.29 CHRONIC RIGHT SHOULDER PAIN: ICD-10-CM

## 2024-05-24 DIAGNOSIS — G89.29 CHRONIC PAIN OF LEFT KNEE: ICD-10-CM

## 2024-05-24 DIAGNOSIS — R41.3 MEMORY IMPAIRMENT: ICD-10-CM

## 2024-05-24 DIAGNOSIS — M25.511 CHRONIC RIGHT SHOULDER PAIN: ICD-10-CM

## 2024-05-24 DIAGNOSIS — M25.562 CHRONIC PAIN OF LEFT KNEE: ICD-10-CM
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
## 2024-05-24 DIAGNOSIS — Z12.11 COLON CANCER SCREENING: ICD-10-CM

## 2024-05-24 DIAGNOSIS — Z00.00 ENCOUNTER FOR WELL ADULT EXAM WITHOUT ABNORMAL FINDINGS: Primary | ICD-10-CM

## 2024-05-24 ASSESSMENT — PATIENT HEALTH QUESTIONNAIRE - PHQ9
7. TROUBLE CONCENTRATING ON THINGS, SUCH AS READING THE NEWSPAPER OR WATCHING TELEVISION: NOT AT ALL
5. POOR APPETITE OR OVEREATING: NOT AT ALL
SUM OF ALL RESPONSES TO PHQ QUESTIONS 1-9: 6
10. IF YOU CHECKED OFF ANY PROBLEMS, HOW DIFFICULT HAVE THESE PROBLEMS MADE IT FOR YOU TO DO YOUR WORK, TAKE CARE OF THINGS AT HOME, OR GET ALONG WITH OTHER PEOPLE: SOMEWHAT DIFFICULT
8. MOVING OR SPEAKING SO SLOWLY THAT OTHER PEOPLE COULD HAVE NOTICED. OR THE OPPOSITE, BEING SO FIGETY OR RESTLESS THAT YOU HAVE BEEN MOVING AROUND A LOT MORE THAN USUAL: NOT AT ALL
2. FEELING DOWN, DEPRESSED OR HOPELESS: NOT AT ALL
SUM OF ALL RESPONSES TO PHQ QUESTIONS 1-9: 6
9. THOUGHTS THAT YOU WOULD BE BETTER OFF DEAD, OR OF HURTING YOURSELF: NOT AT ALL
6. FEELING BAD ABOUT YOURSELF - OR THAT YOU ARE A FAILURE OR HAVE LET YOURSELF OR YOUR FAMILY DOWN: NOT AT ALL
4. FEELING TIRED OR HAVING LITTLE ENERGY: NEARLY EVERY DAY
3. TROUBLE FALLING OR STAYING ASLEEP: NEARLY EVERY DAY
SUM OF ALL RESPONSES TO PHQ QUESTIONS 1-9: 6
SUM OF ALL RESPONSES TO PHQ9 QUESTIONS 1 & 2: 0
SUM OF ALL RESPONSES TO PHQ QUESTIONS 1-9: 6
1. LITTLE INTEREST OR PLEASURE IN DOING THINGS: NOT AT ALL

## 2024-05-24 NOTE — PROGRESS NOTES
Chief Complaint   Patient presents with    Annual Exam     \"Have you been to the ER, urgent care clinic since your last visit?  Hospitalized since your last visit?\"    NO    “Have you seen or consulted any other health care providers outside of [facility] since your last visit?”    NO    “Have you had a colorectal cancer screening such as a colonoscopy/FIT/Cologuard?    NO    No colonoscopy on file  No cologuard on file  No FIT/FOBT on file   No flexible sigmoidoscopy on file                    5/24/2024     1:09 PM   PHQ-9    Little interest or pleasure in doing things 0   Feeling down, depressed, or hopeless 0   Trouble falling or staying asleep, or sleeping too much 3   Feeling tired or having little energy 3   Poor appetite or overeating 0   Feeling bad about yourself - or that you are a failure or have let yourself or your family down 0   Trouble concentrating on things, such as reading the newspaper or watching television 0   Moving or speaking so slowly that other people could have noticed. Or the opposite - being so fidgety or restless that you have been moving around a lot more than usual 0   Thoughts that you would be better off dead, or of hurting yourself in some way 0   PHQ-2 Score 0   PHQ-9 Total Score 6   If you checked off any problems, how difficult have these problems made it for you to do your work, take care of things at home, or get along with other people? 1           Financial Resource Strain: Low Risk  (12/6/2023)    Overall Financial Resource Strain (CARDIA)     Difficulty of Paying Living Expenses: Not very hard      Food Insecurity: No Food Insecurity (12/6/2023)    Hunger Vital Sign     Worried About Running Out of Food in the Last Year: Never true     Ran Out of Food in the Last Year: Never true          Health Maintenance Due   Topic Date Due    Hepatitis B vaccine (1 of 3 - 3-dose series) Never done    Colorectal Cancer Screen  Never done    COVID-19 Vaccine (3 - 2023-24

## 2024-05-24 NOTE — PROGRESS NOTES
[facility]  Clinic Note    Well Adult Note  Name: Yue Batista Today’s Date: 2024   MRN: [id number] Sex: Female   Age: 54 y.o. Ethnicity: Non- / Non    : 1970 Race: White (non-)      Yue Batista is here for well adult exam. See's Cira Nguyen w/ GYN. Also reports on  fell on asphault and hit left knee. Fell a second time the same day with right arm stretched. Pain increases with raising arm forward and to the side. Has trouble lowering arm due to pain. Takes naproxen and provides relief.        Review of Systems   Musculoskeletal:  Positive for arthralgias (left knee, right shoulder).   All other systems reviewed and are negative.      Allergies   Allergen Reactions    Penicillins Rash         Prior to Visit Medications    Medication Sig Taking? Authorizing Provider   levothyroxine (SYNTHROID) 125 MCG tablet TAKE 1 TABLET BY MOUTH EVERY DAY BEFORE BREAKFAST Yes Fatoumata Batista APRN - NP   buPROPion (WELLBUTRIN XL) 300 MG extended release tablet Take 1 tablet by mouth every morning Yes Tamiko Burgos APRN - CNP   escitalopram (LEXAPRO) 20 MG tablet Take 1 tablet by mouth daily Yes Tamiko Burgos APRN - CNP   neomycin-polymyxin-hydrocortisone (CORTISPORIN) 3.5-68460-0 otic suspension Place 3 drops in ear(s) 4 times daily Yes Fatoumata Batista, APRN - NP         Past Medical History:   Diagnosis Date    Abnormal Papanicolaou smear of cervix     Depression with anxiety     h/o psychiatric hosp 2013 ([place])    Family history of skin cancer     History of migraine     preivoulsy required Botox    Sun-damaged skin     childhood and young adult    Sunburn, blistering     childhood    Tanning bed exposure     Unspecified hypothyroidism        Past Surgical History:   Procedure Laterality Date    BREAST ENHANCEMENT SURGERY      BREAST SURGERY  2006    bilateral    IMPLANT BREAST SILICONE/EQ Bilateral     2006 [place]    LEEP  2004    [facility] MAMMOGRAM DIGITAL

## 2024-05-28 NOTE — TELEPHONE ENCOUNTER
PCP: Fatoumata Batista, APRN - NP    Last appt: 5/24/2024   Future Appointments   Date Time Provider Department Center   5/29/2024  8:15 AM LAB ONLY [facility] BS AMB   6/6/2024  9:30 AM Tamiko Burgos, APRN - CNP [facility] BS AMB       Requested Prescriptions     Pending Prescriptions Disp Refills    levothyroxine (SYNTHROID) 125 MCG tablet [Pharmacy Med Name: LEVOTHYROXINE 125 MCG TABLET] 90 tablet 0     Sig: TAKE 1 TABLET BY MOUTH EVERY DAY BEFORE BREAKFAST         Prior labs and Blood pressures:  BP Readings from Last 3 Encounters:   05/24/24 122/82   05/24/23 113/77   03/03/23 130/79     Lab Results   Component Value Date/Time     05/24/2023 03:55 PM    K 4.0 05/24/2023 03:55 PM     05/24/2023 03:55 PM    CO2 28 05/24/2023 03:55 PM    BUN 13 05/24/2023 03:55 PM    GFRAA >60 03/17/2022 09:28 AM     No results found for: \"HBA1C\", \"[id number]\"  Lab Results   Component Value Date/Time    CHOL 224 03/17/2022 09:28 AM    HDL 72 03/17/2022 09:28 AM    .8 03/17/2022 09:28 AM    VLDL 13.2 03/17/2022 09:28 AM     No results found for: \"VITD3\"    Lab Results   Component Value Date/Time    TSH 0.28 03/17/2022 09:28 AM

## 2024-05-29 ENCOUNTER — NURSE ONLY (OUTPATIENT)
Age: 54
End: 2024-05-29

## 2024-05-29 DIAGNOSIS — Z00.00 ENCOUNTER FOR WELL ADULT EXAM WITHOUT ABNORMAL FINDINGS: ICD-10-CM

## 2024-05-29 LAB
ALBUMIN SERPL-MCNC: 3.9 G/DL (ref 3.5–5)
ALBUMIN/GLOB SERPL: 1.1 (ref 1.1–2.2)
ALP SERPL-CCNC: 93 U/L (ref 45–117)
ALT SERPL-CCNC: 18 U/L (ref 12–78)
ANION GAP SERPL CALC-SCNC: 4 MMOL/L (ref 5–15)
AST SERPL-CCNC: 17 U/L (ref 15–37)
BASOPHILS # BLD: 0 K/UL (ref 0–0.1)
BASOPHILS NFR BLD: 1 % (ref 0–1)
BILIRUB SERPL-MCNC: 0.4 MG/DL (ref 0.2–1)
BUN SERPL-MCNC: 19 MG/DL (ref 6–20)
BUN/CREAT SERPL: 19 (ref 12–20)
CALCIUM SERPL-MCNC: 9.4 MG/DL (ref 8.5–10.1)
CHLORIDE SERPL-SCNC: 109 MMOL/L (ref 97–108)
CHOLEST SERPL-MCNC: 222 MG/DL
CO2 SERPL-SCNC: 28 MMOL/L (ref 21–32)
CREAT SERPL-MCNC: 0.99 MG/DL (ref 0.55–1.02)
DIFFERENTIAL METHOD BLD: NORMAL
EOSINOPHIL # BLD: 0.1 K/UL (ref 0–0.4)
EOSINOPHIL NFR BLD: 2 % (ref 0–7)
ERYTHROCYTE [DISTWIDTH] IN BLOOD BY AUTOMATED COUNT: 12.8 % (ref 11.5–14.5)
EST. AVERAGE GLUCOSE BLD GHB EST-MCNC: 108 MG/DL
GLOBULIN SER CALC-MCNC: 3.5 G/DL (ref 2–4)
GLUCOSE SERPL-MCNC: 102 MG/DL (ref 65–100)
HBA1C MFR BLD: 5.4 % (ref 4–5.6)
HCT VFR BLD AUTO: 41.1 % (ref 35–47)
HDLC SERPL-MCNC: 77 MG/DL
HDLC SERPL: 2.9 (ref 0–5)
HEMOCCULT STL QL: NEGATIVE
HGB BLD-MCNC: 13.6 G/DL (ref 11.5–16)
IMM GRANULOCYTES # BLD AUTO: 0 K/UL (ref 0–0.04)
IMM GRANULOCYTES NFR BLD AUTO: 0 % (ref 0–0.5)
LDLC SERPL CALC-MCNC: 130.8 MG/DL (ref 0–100)
LYMPHOCYTES # BLD: 1.8 K/UL (ref 0.8–3.5)
LYMPHOCYTES NFR BLD: 31 % (ref 12–49)
MCH RBC QN AUTO: 30.7 PG (ref 26–34)
MCHC RBC AUTO-ENTMCNC: 33.1 G/DL (ref 30–36.5)
MCV RBC AUTO: 92.8 FL (ref 80–99)
MONOCYTES # BLD: 0.3 K/UL (ref 0–1)
MONOCYTES NFR BLD: 6 % (ref 5–13)
NEUTS SEG # BLD: 3.4 K/UL (ref 1.8–8)
NEUTS SEG NFR BLD: 60 % (ref 32–75)
NRBC # BLD: 0 K/UL (ref 0–0.01)
NRBC BLD-RTO: 0 PER 100 WBC
PLATELET # BLD AUTO: 191 K/UL (ref 150–400)
PMV BLD AUTO: 11.8 FL (ref 8.9–12.9)
POTASSIUM SERPL-SCNC: 4.1 MMOL/L (ref 3.5–5.1)
PROT SERPL-MCNC: 7.4 G/DL (ref 6.4–8.2)
RBC # BLD AUTO: 4.43 M/UL (ref 3.8–5.2)
SODIUM SERPL-SCNC: 141 MMOL/L (ref 136–145)
T4 FREE SERPL-MCNC: 1 NG/DL (ref 0.8–1.5)
TRIGL SERPL-MCNC: 71 MG/DL
TSH SERPL DL<=0.05 MIU/L-ACNC: 3.46 UIU/ML (ref 0.36–3.74)
VALID INTERNAL CONTROL: YES
VLDLC SERPL CALC-MCNC: 14.2 MG/DL
WBC # BLD AUTO: 5.7 K/UL (ref 3.6–11)

## 2024-05-31 RX ORDER — LEVOTHYROXINE SODIUM 0.12 MG/1
125 TABLET ORAL
Qty: 90 TABLET | Refills: 0 | Status: SHIPPED | OUTPATIENT
Start: 2024-05-31

## 2024-06-03 ENCOUNTER — HOSPITAL ENCOUNTER (OUTPATIENT)
Facility: HOSPITAL | Age: 54
Discharge: HOME | End: 2024-06-06
Payer: COMMERCIAL

## 2024-06-03 VITALS — BODY MASS INDEX: 30.05 KG/M2 | WEIGHT: 176 LBS | HEIGHT: 64 IN

## 2024-06-03 DIAGNOSIS — Z12.31 VISIT FOR SCREENING MAMMOGRAM: ICD-10-CM

## 2024-06-03 PROCEDURE — 77063 BREAST TOMOSYNTHESIS BI: CPT

## 2024-06-10 ENCOUNTER — OFFICE VISIT (OUTPATIENT)
Age: 54
End: 2024-06-10
Payer: COMMERCIAL

## 2024-06-10 VITALS
WEIGHT: 177.6 LBS | DIASTOLIC BLOOD PRESSURE: 67 MMHG | BODY MASS INDEX: 30.32 KG/M2 | HEIGHT: 64 IN | HEART RATE: 71 BPM | TEMPERATURE: 97.3 F | OXYGEN SATURATION: 96 % | RESPIRATION RATE: 16 BRPM | SYSTOLIC BLOOD PRESSURE: 106 MMHG

## 2024-06-10 DIAGNOSIS — G47.00 INSOMNIA, UNSPECIFIED TYPE: ICD-10-CM

## 2024-06-10 DIAGNOSIS — F41.1 GENERALIZED ANXIETY DISORDER: ICD-10-CM

## 2024-06-10 DIAGNOSIS — F33.1 MAJOR DEPRESSIVE DISORDER, RECURRENT, MODERATE (HCC): Primary | ICD-10-CM

## 2024-06-10 PROCEDURE — 1036F TOBACCO NON-USER: CPT | Performed by: NURSE PRACTITIONER

## 2024-06-10 PROCEDURE — 3017F COLORECTAL CA SCREEN DOC REV: CPT | Performed by: NURSE PRACTITIONER

## 2024-06-10 PROCEDURE — 99214 OFFICE O/P EST MOD 30 MIN: CPT | Performed by: NURSE PRACTITIONER

## 2024-06-10 PROCEDURE — G8417 CALC BMI ABV UP PARAM F/U: HCPCS | Performed by: NURSE PRACTITIONER

## 2024-06-10 PROCEDURE — G8427 DOCREV CUR MEDS BY ELIG CLIN: HCPCS | Performed by: NURSE PRACTITIONER

## 2024-06-10 ASSESSMENT — PATIENT HEALTH QUESTIONNAIRE - PHQ9
2. FEELING DOWN, DEPRESSED OR HOPELESS: SEVERAL DAYS
SUM OF ALL RESPONSES TO PHQ QUESTIONS 1-9: 11
SUM OF ALL RESPONSES TO PHQ QUESTIONS 1-9: 11
10. IF YOU CHECKED OFF ANY PROBLEMS, HOW DIFFICULT HAVE THESE PROBLEMS MADE IT FOR YOU TO DO YOUR WORK, TAKE CARE OF THINGS AT HOME, OR GET ALONG WITH OTHER PEOPLE: SOMEWHAT DIFFICULT
7. TROUBLE CONCENTRATING ON THINGS, SUCH AS READING THE NEWSPAPER OR WATCHING TELEVISION: NOT AT ALL
3. TROUBLE FALLING OR STAYING ASLEEP: NEARLY EVERY DAY
SUM OF ALL RESPONSES TO PHQ QUESTIONS 1-9: 11
SUM OF ALL RESPONSES TO PHQ QUESTIONS 1-9: 11
9. THOUGHTS THAT YOU WOULD BE BETTER OFF DEAD, OR OF HURTING YOURSELF: NOT AT ALL
5. POOR APPETITE OR OVEREATING: NOT AT ALL
4. FEELING TIRED OR HAVING LITTLE ENERGY: NEARLY EVERY DAY
1. LITTLE INTEREST OR PLEASURE IN DOING THINGS: SEVERAL DAYS
6. FEELING BAD ABOUT YOURSELF - OR THAT YOU ARE A FAILURE OR HAVE LET YOURSELF OR YOUR FAMILY DOWN: NEARLY EVERY DAY
SUM OF ALL RESPONSES TO PHQ9 QUESTIONS 1 & 2: 2
8. MOVING OR SPEAKING SO SLOWLY THAT OTHER PEOPLE COULD HAVE NOTICED. OR THE OPPOSITE, BEING SO FIGETY OR RESTLESS THAT YOU HAVE BEEN MOVING AROUND A LOT MORE THAN USUAL: NOT AT ALL

## 2024-06-10 ASSESSMENT — ANXIETY QUESTIONNAIRES
4. TROUBLE RELAXING: NEARLY EVERY DAY
7. FEELING AFRAID AS IF SOMETHING AWFUL MIGHT HAPPEN: SEVERAL DAYS
3. WORRYING TOO MUCH ABOUT DIFFERENT THINGS: NEARLY EVERY DAY
5. BEING SO RESTLESS THAT IT IS HARD TO SIT STILL: NOT AT ALL
6. BECOMING EASILY ANNOYED OR IRRITABLE: SEVERAL DAYS
IF YOU CHECKED OFF ANY PROBLEMS ON THIS QUESTIONNAIRE, HOW DIFFICULT HAVE THESE PROBLEMS MADE IT FOR YOU TO DO YOUR WORK, TAKE CARE OF THINGS AT HOME, OR GET ALONG WITH OTHER PEOPLE: SOMEWHAT DIFFICULT
2. NOT BEING ABLE TO STOP OR CONTROL WORRYING: NEARLY EVERY DAY
GAD7 TOTAL SCORE: 14
1. FEELING NERVOUS, ANXIOUS, OR ON EDGE: NEARLY EVERY DAY

## 2024-06-10 NOTE — PROGRESS NOTES
visit?No    2. Have you seen or consulted any other health care providers outside of the [facility] since your last visit?  Include any pap smears or colon screening. No    
the ED if they develop thoughts of harm to self or others. Pt provided National Suicide Hotline- 877.916.7494, Behavioral Health EmergencySuicide- 988; TOD \"WARM\" Line- 1-677.316.7714     8.  Patient was given the full opportunity to ask questions, voice any concerns and was encouraged to take an active role in his/her care and overall treatment regimen. We also discussed the proposed/continued treatment options available to him/her, alternatives, there risks vs. benefits, the likelihood of success and based upon the information provided he/she mutually agreed with the proposed treatment plan.     9. To maintain continuity of care, a review of historical provider notes, lab results, PCP progress notes, along with procedures and encounters was utilized at the time of this appointment and considered into the treatment and behavioral health medication management plan.      10. Extensive education provided to pt regarding Cannabis and synthetic Cannabinoid use in regard to research detailing adverse consequences both medically and psychiatrically including cardiovascular events (MI, stroke, emboli), acute kidney injury, generalized tonic-clonic seizures, hyperemesis, cancer risk, and fatalities. Psychiatric complications include but not limited to psychosis, paranoia, addiction/tolerance/dependence, self-harm/suicidal ideation, decreased impulse control, and irreversible damage to the areas of the brain essential for memory, memory storage, focus, attention, and cognition. Pt verbalized understanding.     11. Pt also provided printed NIH and [facility] materials r/t Cannabis use to include THC and CBD.       SABINO Blake - CNP  6/10/2024

## 2024-06-24 NOTE — TELEPHONE ENCOUNTER
PCP: Fatoumata Batista APRN - NP    Last appt: 5/24/2024     Future Appointments   Date Time Provider Department Center   12/11/2024  9:00 AM Jessica Cortes APRN - NP [facility] BS AMB       Requested Prescriptions     Pending Prescriptions Disp Refills    levothyroxine (SYNTHROID) 125 MCG tablet [Pharmacy Med Name: L-THYROXINE (SYNTHROID) TABS 125MCG]  0       Prior labs and Blood pressures:  BP Readings from Last 3 Encounters:   05/24/24 122/82   05/24/23 113/77   03/03/23 130/79     Lab Results   Component Value Date/Time     05/29/2024 09:47 AM    K 4.1 05/29/2024 09:47 AM     05/29/2024 09:47 AM    CO2 28 05/29/2024 09:47 AM    BUN 19 05/29/2024 09:47 AM    GFRAA >60 03/17/2022 09:28 AM     No results found for: \"HBA1C\", \"[id number]\"  Lab Results   Component Value Date/Time    CHOL 222 05/29/2024 09:47 AM    HDL 77 05/29/2024 09:47 AM    .8 05/29/2024 09:47 AM    .8 03/17/2022 09:28 AM    VLDL 14.2 05/29/2024 09:47 AM     No results found for: \"VITD3\"    Lab Results   Component Value Date/Time    TSH 0.28 03/17/2022 09:28 AM

## 2024-06-25 RX ORDER — LEVOTHYROXINE SODIUM 0.12 MG/1
125 TABLET ORAL DAILY
Qty: 90 TABLET | Refills: 2 | Status: SHIPPED | OUTPATIENT
Start: 2024-06-25

## 2024-08-29 NOTE — TELEPHONE ENCOUNTER
PCP: Fatoumata Batista APRN - NP    Last appt: 5/24/2024   Future Appointments   Date Time Provider Department Center   12/11/2024  9:00 AM Jessica Cortes APRN - NP [facility] BS AMB       Requested Prescriptions     Pending Prescriptions Disp Refills    levothyroxine (SYNTHROID) 125 MCG tablet [Pharmacy Med Name: LEVOTHYROXINE 125 MCG TABLET] 90 tablet 2     Sig: TAKE 1 TABLET BY MOUTH EVERY DAY BEFORE BREAKFAST         Prior labs and Blood pressures:  BP Readings from Last 3 Encounters:   05/24/24 122/82   05/24/23 113/77   03/03/23 130/79     Lab Results   Component Value Date/Time     05/29/2024 09:47 AM    K 4.1 05/29/2024 09:47 AM     05/29/2024 09:47 AM    CO2 28 05/29/2024 09:47 AM    BUN 19 05/29/2024 09:47 AM    GFRAA >60 03/17/2022 09:28 AM     No results found for: \"HBA1C\", \"[id number]\"  Lab Results   Component Value Date/Time    CHOL 222 05/29/2024 09:47 AM    HDL 77 05/29/2024 09:47 AM    .8 05/29/2024 09:47 AM    .8 03/17/2022 09:28 AM    VLDL 14.2 05/29/2024 09:47 AM     No results found for: \"VITD3\"    Lab Results   Component Value Date/Time    TSH 3.46 05/29/2024 09:47 AM

## 2024-08-30 RX ORDER — LEVOTHYROXINE SODIUM 125 UG/1
125 TABLET ORAL
Qty: 90 TABLET | Refills: 2 | Status: SHIPPED | OUTPATIENT
Start: 2024-08-30

## 2024-09-19 ENCOUNTER — OFFICE VISIT (OUTPATIENT)
Age: 54
End: 2024-09-19
Payer: COMMERCIAL

## 2024-09-19 VITALS
SYSTOLIC BLOOD PRESSURE: 131 MMHG | DIASTOLIC BLOOD PRESSURE: 85 MMHG | OXYGEN SATURATION: 99 % | HEIGHT: 64 IN | BODY MASS INDEX: 30.93 KG/M2 | WEIGHT: 181.2 LBS | RESPIRATION RATE: 14 BRPM | TEMPERATURE: 97.5 F | HEART RATE: 79 BPM

## 2024-09-19 DIAGNOSIS — R31.0 GROSS HEMATURIA: Primary | ICD-10-CM

## 2024-09-19 DIAGNOSIS — R31.9 HEMATURIA, UNSPECIFIED TYPE: Primary | ICD-10-CM

## 2024-09-19 DIAGNOSIS — R31.9 HEMATURIA, UNSPECIFIED TYPE: ICD-10-CM

## 2024-09-19 LAB
BILIRUBIN, URINE, POC: NEGATIVE
BLOOD URINE, POC: NORMAL
GLUCOSE URINE, POC: NEGATIVE
KETONES, URINE, POC: NEGATIVE
LEUKOCYTE ESTERASE, URINE, POC: NEGATIVE
NITRITE, URINE, POC: NEGATIVE
PH, URINE, POC: 6 (ref 4.6–8)
PROTEIN,URINE, POC: NEGATIVE
SPECIFIC GRAVITY, URINE, POC: 1.03 (ref 1–1.03)
URINALYSIS CLARITY, POC: CLEAR
URINALYSIS COLOR, POC: YELLOW
UROBILINOGEN, POC: NORMAL

## 2024-09-19 PROCEDURE — G8427 DOCREV CUR MEDS BY ELIG CLIN: HCPCS | Performed by: NURSE PRACTITIONER

## 2024-09-19 PROCEDURE — 99213 OFFICE O/P EST LOW 20 MIN: CPT | Performed by: NURSE PRACTITIONER

## 2024-09-19 PROCEDURE — 3017F COLORECTAL CA SCREEN DOC REV: CPT | Performed by: NURSE PRACTITIONER

## 2024-09-19 PROCEDURE — G8417 CALC BMI ABV UP PARAM F/U: HCPCS | Performed by: NURSE PRACTITIONER

## 2024-09-19 PROCEDURE — 1036F TOBACCO NON-USER: CPT | Performed by: NURSE PRACTITIONER

## 2024-09-19 PROCEDURE — 81001 URINALYSIS AUTO W/SCOPE: CPT | Performed by: NURSE PRACTITIONER

## 2024-09-19 ASSESSMENT — ENCOUNTER SYMPTOMS: NAUSEA: 0

## 2024-09-20 LAB
BACTERIA SPEC CULT: NORMAL
SERVICE CMNT-IMP: NORMAL

## 2024-12-10 ASSESSMENT — ANXIETY QUESTIONNAIRES
4. TROUBLE RELAXING: NEARLY EVERY DAY
IF YOU CHECKED OFF ANY PROBLEMS ON THIS QUESTIONNAIRE, HOW DIFFICULT HAVE THESE PROBLEMS MADE IT FOR YOU TO DO YOUR WORK, TAKE CARE OF THINGS AT HOME, OR GET ALONG WITH OTHER PEOPLE: VERY DIFFICULT
4. TROUBLE RELAXING: NEARLY EVERY DAY
7. FEELING AFRAID AS IF SOMETHING AWFUL MIGHT HAPPEN: NOT AT ALL
GAD7 TOTAL SCORE: 15
3. WORRYING TOO MUCH ABOUT DIFFERENT THINGS: NEARLY EVERY DAY
2. NOT BEING ABLE TO STOP OR CONTROL WORRYING: NEARLY EVERY DAY
1. FEELING NERVOUS, ANXIOUS, OR ON EDGE: NEARLY EVERY DAY
5. BEING SO RESTLESS THAT IT IS HARD TO SIT STILL: NOT AT ALL
6. BECOMING EASILY ANNOYED OR IRRITABLE: NEARLY EVERY DAY
5. BEING SO RESTLESS THAT IT IS HARD TO SIT STILL: NOT AT ALL
IF YOU CHECKED OFF ANY PROBLEMS ON THIS QUESTIONNAIRE, HOW DIFFICULT HAVE THESE PROBLEMS MADE IT FOR YOU TO DO YOUR WORK, TAKE CARE OF THINGS AT HOME, OR GET ALONG WITH OTHER PEOPLE: VERY DIFFICULT
3. WORRYING TOO MUCH ABOUT DIFFERENT THINGS: NEARLY EVERY DAY
7. FEELING AFRAID AS IF SOMETHING AWFUL MIGHT HAPPEN: NOT AT ALL
1. FEELING NERVOUS, ANXIOUS, OR ON EDGE: NEARLY EVERY DAY
2. NOT BEING ABLE TO STOP OR CONTROL WORRYING: NEARLY EVERY DAY
6. BECOMING EASILY ANNOYED OR IRRITABLE: NEARLY EVERY DAY

## 2024-12-10 ASSESSMENT — PATIENT HEALTH QUESTIONNAIRE - PHQ9
8. MOVING OR SPEAKING SO SLOWLY THAT OTHER PEOPLE COULD HAVE NOTICED. OR THE OPPOSITE - BEING SO FIDGETY OR RESTLESS THAT YOU HAVE BEEN MOVING AROUND A LOT MORE THAN USUAL: NOT AT ALL
8. MOVING OR SPEAKING SO SLOWLY THAT OTHER PEOPLE COULD HAVE NOTICED. OR THE OPPOSITE, BEING SO FIGETY OR RESTLESS THAT YOU HAVE BEEN MOVING AROUND A LOT MORE THAN USUAL: NOT AT ALL
9. THOUGHTS THAT YOU WOULD BE BETTER OFF DEAD, OR OF HURTING YOURSELF: NOT AT ALL
5. POOR APPETITE OR OVEREATING: SEVERAL DAYS
SUM OF ALL RESPONSES TO PHQ9 QUESTIONS 1 & 2: 6
SUM OF ALL RESPONSES TO PHQ QUESTIONS 1-9: 16
SUM OF ALL RESPONSES TO PHQ QUESTIONS 1-9: 16
2. FEELING DOWN, DEPRESSED OR HOPELESS: NEARLY EVERY DAY
SUM OF ALL RESPONSES TO PHQ QUESTIONS 1-9: 16
10. IF YOU CHECKED OFF ANY PROBLEMS, HOW DIFFICULT HAVE THESE PROBLEMS MADE IT FOR YOU TO DO YOUR WORK, TAKE CARE OF THINGS AT HOME, OR GET ALONG WITH OTHER PEOPLE: VERY DIFFICULT
1. LITTLE INTEREST OR PLEASURE IN DOING THINGS: NEARLY EVERY DAY
9. THOUGHTS THAT YOU WOULD BE BETTER OFF DEAD, OR OF HURTING YOURSELF: NOT AT ALL
2. FEELING DOWN, DEPRESSED OR HOPELESS: NEARLY EVERY DAY
6. FEELING BAD ABOUT YOURSELF - OR THAT YOU ARE A FAILURE OR HAVE LET YOURSELF OR YOUR FAMILY DOWN: NEARLY EVERY DAY
7. TROUBLE CONCENTRATING ON THINGS, SUCH AS READING THE NEWSPAPER OR WATCHING TELEVISION: NOT AT ALL
3. TROUBLE FALLING OR STAYING ASLEEP: NEARLY EVERY DAY
7. TROUBLE CONCENTRATING ON THINGS, SUCH AS READING THE NEWSPAPER OR WATCHING TELEVISION: NOT AT ALL
3. TROUBLE FALLING OR STAYING ASLEEP: NEARLY EVERY DAY
4. FEELING TIRED OR HAVING LITTLE ENERGY: NEARLY EVERY DAY
6. FEELING BAD ABOUT YOURSELF - OR THAT YOU ARE A FAILURE OR HAVE LET YOURSELF OR YOUR FAMILY DOWN: NEARLY EVERY DAY
SUM OF ALL RESPONSES TO PHQ QUESTIONS 1-9: 16
10. IF YOU CHECKED OFF ANY PROBLEMS, HOW DIFFICULT HAVE THESE PROBLEMS MADE IT FOR YOU TO DO YOUR WORK, TAKE CARE OF THINGS AT HOME, OR GET ALONG WITH OTHER PEOPLE: VERY DIFFICULT
5. POOR APPETITE OR OVEREATING: SEVERAL DAYS
4. FEELING TIRED OR HAVING LITTLE ENERGY: NEARLY EVERY DAY
1. LITTLE INTEREST OR PLEASURE IN DOING THINGS: NEARLY EVERY DAY
SUM OF ALL RESPONSES TO PHQ QUESTIONS 1-9: 16

## 2024-12-11 ENCOUNTER — OFFICE VISIT (OUTPATIENT)
Age: 54
End: 2024-12-11
Payer: COMMERCIAL

## 2024-12-11 VITALS
RESPIRATION RATE: 16 BRPM | TEMPERATURE: 98.1 F | OXYGEN SATURATION: 99 % | BODY MASS INDEX: 29.88 KG/M2 | HEIGHT: 64 IN | HEART RATE: 75 BPM | DIASTOLIC BLOOD PRESSURE: 80 MMHG | SYSTOLIC BLOOD PRESSURE: 117 MMHG | WEIGHT: 175 LBS

## 2024-12-11 DIAGNOSIS — F33.1 MAJOR DEPRESSIVE DISORDER, RECURRENT, MODERATE (HCC): Primary | ICD-10-CM

## 2024-12-11 DIAGNOSIS — F41.1 GENERALIZED ANXIETY DISORDER: ICD-10-CM

## 2024-12-11 PROCEDURE — 1036F TOBACCO NON-USER: CPT | Performed by: NURSE PRACTITIONER

## 2024-12-11 PROCEDURE — 90792 PSYCH DIAG EVAL W/MED SRVCS: CPT | Performed by: NURSE PRACTITIONER

## 2024-12-11 NOTE — PROGRESS NOTES
Chief Complaint   Patient presents with    New Patient     Former Tamiko Burgos pt      Vitals:    12/11/24 0900   BP: 117/80   Pulse: 75   Resp: 16   Temp: 98.1 °F (36.7 °C)   SpO2: 99%      Prior to Admission medications    Medication Sig Start Date End Date Taking? Authorizing Provider   levothyroxine (SYNTHROID) 125 MCG tablet TAKE 1 TABLET BY MOUTH EVERY DAY BEFORE BREAKFAST 8/30/24  Yes Fatoumata Batista APRN - NP   buPROPion (WELLBUTRIN XL) 300 MG extended release tablet Take 1 tablet by mouth every morning 3/28/24  Yes Tamiko Burgos APRN - CNP   escitalopram (LEXAPRO) 20 MG tablet Take 1 tablet by mouth daily 3/28/24  Yes Tamiko Burgos APRN - CNP   neomycin-polymyxin-hydrocortisone (CORTISPORIN) 3.5-19011-0 otic suspension Place 3 drops in ear(s) 4 times daily 5/24/23  Yes Fatoumata Batista APRN - NP      PHQ-9 Total Score: 16 (12/10/2024  7:04 PM)  Thoughts that you would be better off dead, or of hurting yourself in some way: 0 (12/10/2024  7:04 PM)         12/10/2024     7:04 PM 6/10/2024     8:52 AM 5/24/2024     1:09 PM   PHQ-9    Little interest or pleasure in doing things 3 1 0   Feeling down, depressed, or hopeless 3 1 0   Trouble falling or staying asleep, or sleeping too much 3 3 3   Feeling tired or having little energy 3 3 3   Poor appetite or overeating 1 0 0   Feeling bad about yourself - or that you are a failure or have let yourself or your family down 3 3 0   Trouble concentrating on things, such as reading the newspaper or watching television 0 0 0   Moving or speaking so slowly that other people could have noticed. Or the opposite - being so fidgety or restless that you have been moving around a lot more than usual 0 0 0   Thoughts that you would be better off dead, or of hurting yourself in some way 0 0 0   PHQ-2 Score 6 2 0   PHQ-9 Total Score 16 11 6   If you checked off any problems, how difficult have these problems made it for you to do your work, take care of things at home, or get along with 
these problems made it for you to do your work, take care of things at home, or get along with other people? 2              12/10/2024     7:01 PM 6/10/2024     8:52 AM 12/6/2023     8:57 AM   ROSSY-7 SCREENING   Feeling nervous, anxious, or on edge Nearly every day Nearly every day Several days   Not being able to stop or control worrying Nearly every day Nearly every day More than half the days   Worrying too much about different things Nearly every day Nearly every day Not at all   Trouble relaxing Nearly every day Nearly every day Several days   Being so restless that it is hard to sit still Not at all Not at all Not at all   Becoming easily annoyed or irritable Nearly every day Several days Not at all   Feeling afraid as if something awful might happen Not at all Several days Not at all   ROSSY-7 Total Score 15 14 4   How difficult have these problems made it for you to do your work, take care of things at home, or get along with other people? Very difficult Somewhat difficult Somewhat difficult       MENTAL STATUS EXAM:     Orientation oriented to time, place and person   Vital Signs (BP,Pulse, Temp) See below (reviewed)   Gait and Station Within normal limits   Abnormal Muscular Movements/Tone/Behavior No EPS, no Tardive Dyskinesia, no abnormal muscular movements; wnl tone   Relations cooperative   General Appearance:  age appropriate and casually dressed, good grooming, good eye contact   Language No aphasia or dysarthria   Speech:  normal pitch and normal volume   Thought Processes logical, wnl rate of thoughts, good abstract reasoning and computation   Thought Associations within normal limits   Thought Content no hallucinations and no delusions   Suicidal Ideations none   Homicidal Ideations none   Mood:  anxious and depressed   Affect:  normal and mood-congruent   Memory recent  adequate   Memory remote:  adequate   Concentration/Attention:  adequate   Fund of Knowledge Fair/average   Insight:  good

## 2024-12-12 RX ORDER — BUPROPION HYDROCHLORIDE 300 MG/1
300 TABLET ORAL EVERY MORNING
Qty: 90 TABLET | Refills: 3 | Status: SHIPPED | OUTPATIENT
Start: 2024-12-12

## 2024-12-12 RX ORDER — ESCITALOPRAM OXALATE 20 MG/1
20 TABLET ORAL DAILY
Qty: 90 TABLET | Refills: 3 | Status: SHIPPED | OUTPATIENT
Start: 2024-12-12

## 2025-03-11 ASSESSMENT — ANXIETY QUESTIONNAIRES
1. FEELING NERVOUS, ANXIOUS, OR ON EDGE: NEARLY EVERY DAY
1. FEELING NERVOUS, ANXIOUS, OR ON EDGE: NEARLY EVERY DAY
6. BECOMING EASILY ANNOYED OR IRRITABLE: MORE THAN HALF THE DAYS
2. NOT BEING ABLE TO STOP OR CONTROL WORRYING: SEVERAL DAYS
6. BECOMING EASILY ANNOYED OR IRRITABLE: MORE THAN HALF THE DAYS
5. BEING SO RESTLESS THAT IT IS HARD TO SIT STILL: NOT AT ALL
3. WORRYING TOO MUCH ABOUT DIFFERENT THINGS: SEVERAL DAYS
5. BEING SO RESTLESS THAT IT IS HARD TO SIT STILL: NOT AT ALL
2. NOT BEING ABLE TO STOP OR CONTROL WORRYING: SEVERAL DAYS
4. TROUBLE RELAXING: SEVERAL DAYS
4. TROUBLE RELAXING: SEVERAL DAYS
7. FEELING AFRAID AS IF SOMETHING AWFUL MIGHT HAPPEN: SEVERAL DAYS
7. FEELING AFRAID AS IF SOMETHING AWFUL MIGHT HAPPEN: SEVERAL DAYS
IF YOU CHECKED OFF ANY PROBLEMS ON THIS QUESTIONNAIRE, HOW DIFFICULT HAVE THESE PROBLEMS MADE IT FOR YOU TO DO YOUR WORK, TAKE CARE OF THINGS AT HOME, OR GET ALONG WITH OTHER PEOPLE: VERY DIFFICULT
IF YOU CHECKED OFF ANY PROBLEMS ON THIS QUESTIONNAIRE, HOW DIFFICULT HAVE THESE PROBLEMS MADE IT FOR YOU TO DO YOUR WORK, TAKE CARE OF THINGS AT HOME, OR GET ALONG WITH OTHER PEOPLE: VERY DIFFICULT
3. WORRYING TOO MUCH ABOUT DIFFERENT THINGS: SEVERAL DAYS
GAD7 TOTAL SCORE: 9

## 2025-03-11 ASSESSMENT — PATIENT HEALTH QUESTIONNAIRE - PHQ9
1. LITTLE INTEREST OR PLEASURE IN DOING THINGS: SEVERAL DAYS
SUM OF ALL RESPONSES TO PHQ QUESTIONS 1-9: 12
6. FEELING BAD ABOUT YOURSELF - OR THAT YOU ARE A FAILURE OR HAVE LET YOURSELF OR YOUR FAMILY DOWN: NEARLY EVERY DAY
7. TROUBLE CONCENTRATING ON THINGS, SUCH AS READING THE NEWSPAPER OR WATCHING TELEVISION: SEVERAL DAYS
6. FEELING BAD ABOUT YOURSELF - OR THAT YOU ARE A FAILURE OR HAVE LET YOURSELF OR YOUR FAMILY DOWN: NEARLY EVERY DAY
7. TROUBLE CONCENTRATING ON THINGS, SUCH AS READING THE NEWSPAPER OR WATCHING TELEVISION: SEVERAL DAYS
SUM OF ALL RESPONSES TO PHQ QUESTIONS 1-9: 12
2. FEELING DOWN, DEPRESSED OR HOPELESS: SEVERAL DAYS
4. FEELING TIRED OR HAVING LITTLE ENERGY: NEARLY EVERY DAY
3. TROUBLE FALLING OR STAYING ASLEEP: SEVERAL DAYS
8. MOVING OR SPEAKING SO SLOWLY THAT OTHER PEOPLE COULD HAVE NOTICED. OR THE OPPOSITE, BEING SO FIGETY OR RESTLESS THAT YOU HAVE BEEN MOVING AROUND A LOT MORE THAN USUAL: NOT AT ALL
10. IF YOU CHECKED OFF ANY PROBLEMS, HOW DIFFICULT HAVE THESE PROBLEMS MADE IT FOR YOU TO DO YOUR WORK, TAKE CARE OF THINGS AT HOME, OR GET ALONG WITH OTHER PEOPLE: VERY DIFFICULT
2. FEELING DOWN, DEPRESSED OR HOPELESS: SEVERAL DAYS
9. THOUGHTS THAT YOU WOULD BE BETTER OFF DEAD, OR OF HURTING YOURSELF: NOT AT ALL
1. LITTLE INTEREST OR PLEASURE IN DOING THINGS: SEVERAL DAYS
8. MOVING OR SPEAKING SO SLOWLY THAT OTHER PEOPLE COULD HAVE NOTICED. OR THE OPPOSITE - BEING SO FIDGETY OR RESTLESS THAT YOU HAVE BEEN MOVING AROUND A LOT MORE THAN USUAL: NOT AT ALL
SUM OF ALL RESPONSES TO PHQ QUESTIONS 1-9: 12
SUM OF ALL RESPONSES TO PHQ QUESTIONS 1-9: 12
10. IF YOU CHECKED OFF ANY PROBLEMS, HOW DIFFICULT HAVE THESE PROBLEMS MADE IT FOR YOU TO DO YOUR WORK, TAKE CARE OF THINGS AT HOME, OR GET ALONG WITH OTHER PEOPLE: VERY DIFFICULT
4. FEELING TIRED OR HAVING LITTLE ENERGY: NEARLY EVERY DAY
9. THOUGHTS THAT YOU WOULD BE BETTER OFF DEAD, OR OF HURTING YOURSELF: NOT AT ALL
5. POOR APPETITE OR OVEREATING: MORE THAN HALF THE DAYS
5. POOR APPETITE OR OVEREATING: MORE THAN HALF THE DAYS
3. TROUBLE FALLING OR STAYING ASLEEP: SEVERAL DAYS
SUM OF ALL RESPONSES TO PHQ QUESTIONS 1-9: 12

## 2025-03-12 ENCOUNTER — OFFICE VISIT (OUTPATIENT)
Age: 55
End: 2025-03-12
Payer: COMMERCIAL

## 2025-03-12 VITALS
DIASTOLIC BLOOD PRESSURE: 86 MMHG | BODY MASS INDEX: 29.88 KG/M2 | OXYGEN SATURATION: 98 % | WEIGHT: 175 LBS | RESPIRATION RATE: 18 BRPM | HEART RATE: 68 BPM | TEMPERATURE: 98.1 F | HEIGHT: 64 IN | SYSTOLIC BLOOD PRESSURE: 127 MMHG

## 2025-03-12 DIAGNOSIS — F41.1 GENERALIZED ANXIETY DISORDER: ICD-10-CM

## 2025-03-12 DIAGNOSIS — F33.1 MAJOR DEPRESSIVE DISORDER, RECURRENT, MODERATE (HCC): Primary | ICD-10-CM

## 2025-03-12 DIAGNOSIS — G47.00 INSOMNIA, UNSPECIFIED TYPE: ICD-10-CM

## 2025-03-12 PROCEDURE — G8427 DOCREV CUR MEDS BY ELIG CLIN: HCPCS | Performed by: NURSE PRACTITIONER

## 2025-03-12 PROCEDURE — G8417 CALC BMI ABV UP PARAM F/U: HCPCS | Performed by: NURSE PRACTITIONER

## 2025-03-12 PROCEDURE — 3017F COLORECTAL CA SCREEN DOC REV: CPT | Performed by: NURSE PRACTITIONER

## 2025-03-12 PROCEDURE — 99214 OFFICE O/P EST MOD 30 MIN: CPT | Performed by: NURSE PRACTITIONER

## 2025-03-12 PROCEDURE — 1036F TOBACCO NON-USER: CPT | Performed by: NURSE PRACTITIONER

## 2025-03-12 NOTE — PROGRESS NOTES
CHIEF COMPLAINT:  Yue Batista is a 55 y.o. female and was seen today for follow-up of psychiatric condition and psychotropic medication management.     HPI:    Yue reports the following psychiatric symptoms by hx: depression, anxiety. Overall symptoms have been present for years. Currently symptoms are of moderate severity. The symptoms occur intermittently and are generally exacerbated by stress at work or with caring for patient's mother who has Parkinson's. She reports that her mom has returned to her home and that she is looking forward to being less anxious. Her depression and anxiety are somewhat increased. She rates them both at 6-7/10 with 10 being the worst. Energy level is fair. Patient reports that she has not been as socially and physically active due to caring for her mother. She hopes to get back into yoga since her mother has decided to return to her home. She is compliant with her medication regimen, reporting no adverse side effects or concerns. There have been some increase in her appetite. Sleep: reports some nights of restlessness. She reports abstaining from use of THC since caring for her mother. The patient denies any signs of psychosis or claudia, and has not voiced any suicidal or homicidal ideations.      Current Outpatient Medications on File Prior to Visit   Medication Sig Dispense Refill    buPROPion (WELLBUTRIN XL) 300 MG extended release tablet Take 1 tablet by mouth every morning 90 tablet 3    escitalopram (LEXAPRO) 20 MG tablet Take 1 tablet by mouth daily 90 tablet 3    levothyroxine (SYNTHROID) 125 MCG tablet TAKE 1 TABLET BY MOUTH EVERY DAY BEFORE BREAKFAST 90 tablet 2    neomycin-polymyxin-hydrocortisone (CORTISPORIN) 3.5-73898-7 otic suspension Place 3 drops in ear(s) 4 times daily 10 mL 0     No current facility-administered medications on file prior to visit.        Past Medical History:   Diagnosis Date    Abnormal Papanicolaou smear of cervix     Depression with anxiety

## 2025-03-12 NOTE — PROGRESS NOTES
Chief Complaint   Patient presents with    Medication Check      Vitals:    03/12/25 0958   BP: 127/86   Pulse: 68   Resp: 18   Temp: 98.1 °F (36.7 °C)   SpO2: 98%      Prior to Admission medications    Medication Sig Start Date End Date Taking? Authorizing Provider   buPROPion (WELLBUTRIN XL) 300 MG extended release tablet Take 1 tablet by mouth every morning 12/12/24  Yes Jessica Cortes APRN - NP   escitalopram (LEXAPRO) 20 MG tablet Take 1 tablet by mouth daily 12/12/24  Yes Jessica Cortes APRN - NP   levothyroxine (SYNTHROID) 125 MCG tablet TAKE 1 TABLET BY MOUTH EVERY DAY BEFORE BREAKFAST 8/30/24  Yes Fatoumata Batista APRN - NP   neomycin-polymyxin-hydrocortisone (CORTISPORIN) 3.5-63857-8 otic suspension Place 3 drops in ear(s) 4 times daily 5/24/23  Yes Fatoumata Batista APRN - NP      PHQ-9 Total Score: (Patient-Rptd) 12 (3/11/2025 10:18 PM)  Thoughts that you would be better off dead, or of hurting yourself in some way: (Patient-Rptd) 0 (3/11/2025 10:18 PM)         3/11/2025    10:18 PM 12/10/2024     7:04 PM 6/10/2024     8:52 AM   PHQ-9    Little interest or pleasure in doing things 1 3 1   Feeling down, depressed, or hopeless 1 3 1   Trouble falling or staying asleep, or sleeping too much 1 3 3   Feeling tired or having little energy 3 3 3   Poor appetite or overeating 2 1 0   Feeling bad about yourself - or that you are a failure or have let yourself or your family down 3 3 3   Trouble concentrating on things, such as reading the newspaper or watching television 1 0 0   Moving or speaking so slowly that other people could have noticed. Or the opposite - being so fidgety or restless that you have been moving around a lot more than usual 0 0 0   Thoughts that you would be better off dead, or of hurting yourself in some way 0 0 0   PHQ-2 Score 2  6  2   PHQ-9 Total Score 12  16  11   If you checked off any problems, how difficult have these problems made it for you to do your work, take care of

## 2025-06-19 ENCOUNTER — HOSPITAL ENCOUNTER (OUTPATIENT)
Facility: HOSPITAL | Age: 55
Discharge: HOME | End: 2025-06-22
Payer: COMMERCIAL

## 2025-06-19 VITALS — WEIGHT: 174 LBS | HEIGHT: 64 IN | BODY MASS INDEX: 29.71 KG/M2

## 2025-06-19 DIAGNOSIS — Z12.31 VISIT FOR SCREENING MAMMOGRAM: ICD-10-CM

## 2025-06-19 PROCEDURE — 77063 BREAST TOMOSYNTHESIS BI: CPT

## 2025-06-24 ENCOUNTER — RESULTS FOLLOW-UP (OUTPATIENT)
Age: 55
End: 2025-06-24

## 2025-06-24 DIAGNOSIS — Z12.39 ENCOUNTER FOR BREAST CANCER SCREENING USING NON-MAMMOGRAM MODALITY: Primary | ICD-10-CM

## 2025-06-24 DIAGNOSIS — R92.343 EXTREMELY DENSE TISSUE OF BOTH BREASTS ON MAMMOGRAPHY: ICD-10-CM

## 2025-07-02 RX ORDER — LEVOTHYROXINE SODIUM 125 UG/1
125 TABLET ORAL
Qty: 90 TABLET | Refills: 0 | Status: SHIPPED | OUTPATIENT
Start: 2025-07-02

## 2025-07-02 NOTE — TELEPHONE ENCOUNTER
PCP: Fatoumata Batista APRN - NP    Last appt: 9/19/2024   Future Appointments   Date Time Provider Department Center   8/13/2025 10:00 AM Jessica Cortes APRN - NP [facility]   8/28/2025  3:40 PM Fatoumata Batista APRN - NP [facility]       Requested Prescriptions     Pending Prescriptions Disp Refills    levothyroxine (SYNTHROID) 125 MCG tablet [Pharmacy Med Name: LEVOTHYROXINE 125 MCG TABLET] 90 tablet 2     Sig: TAKE 1 TABLET BY MOUTH EVERY DAY BEFORE BREAKFAST

## 2025-08-28 ENCOUNTER — OFFICE VISIT (OUTPATIENT)
Age: 55
End: 2025-08-28
Payer: COMMERCIAL

## 2025-08-28 VITALS
SYSTOLIC BLOOD PRESSURE: 118 MMHG | BODY MASS INDEX: 32.17 KG/M2 | HEART RATE: 84 BPM | HEIGHT: 64 IN | RESPIRATION RATE: 16 BRPM | DIASTOLIC BLOOD PRESSURE: 74 MMHG | TEMPERATURE: 96.9 F | WEIGHT: 188.4 LBS | OXYGEN SATURATION: 97 %

## 2025-08-28 DIAGNOSIS — Z13.220 ENCOUNTER FOR LIPID SCREENING FOR CARDIOVASCULAR DISEASE: ICD-10-CM

## 2025-08-28 DIAGNOSIS — Z13.1 DIABETES MELLITUS SCREENING: ICD-10-CM

## 2025-08-28 DIAGNOSIS — E03.9 ACQUIRED HYPOTHYROIDISM: ICD-10-CM

## 2025-08-28 DIAGNOSIS — Z87.42 HISTORY OF ABNORMAL CERVICAL PAP SMEAR: ICD-10-CM

## 2025-08-28 DIAGNOSIS — Z12.11 COLON CANCER SCREENING: ICD-10-CM

## 2025-08-28 DIAGNOSIS — Z01.419 WELL WOMAN EXAM WITH ROUTINE GYNECOLOGICAL EXAM: Primary | ICD-10-CM

## 2025-08-28 DIAGNOSIS — F33.1 MAJOR DEPRESSIVE DISORDER, RECURRENT, MODERATE (HCC): ICD-10-CM

## 2025-08-28 DIAGNOSIS — Z13.6 ENCOUNTER FOR LIPID SCREENING FOR CARDIOVASCULAR DISEASE: ICD-10-CM

## 2025-08-28 PROCEDURE — 99396 PREV VISIT EST AGE 40-64: CPT | Performed by: NURSE PRACTITIONER

## 2025-08-28 SDOH — ECONOMIC STABILITY: FOOD INSECURITY: WITHIN THE PAST 12 MONTHS, YOU WORRIED THAT YOUR FOOD WOULD RUN OUT BEFORE YOU GOT MONEY TO BUY MORE.: NEVER TRUE

## 2025-08-28 SDOH — ECONOMIC STABILITY: FOOD INSECURITY: WITHIN THE PAST 12 MONTHS, THE FOOD YOU BOUGHT JUST DIDN'T LAST AND YOU DIDN'T HAVE MONEY TO GET MORE.: NEVER TRUE

## 2025-09-02 LAB
C TRACH RRNA CVX QL NAA+PROBE: NEGATIVE
CYTOLOGIST CVX/VAG CYTO: NORMAL
CYTOLOGY CVX/VAG DOC CYTO: NORMAL
CYTOLOGY CVX/VAG DOC THIN PREP: NORMAL
DX ICD CODE: NORMAL
HPV GENOTYPE REFLEX: NORMAL
HPV I/H RISK 4 DNA CVX QL PROBE+SIG AMP: NEGATIVE
N GONORRHOEA RRNA CVX QL NAA+PROBE: NEGATIVE
OTHER STN SPEC: NORMAL
SERVICE CMNT-IMP: NORMAL
STAT OF ADQ CVX/VAG CYTO-IMP: NORMAL
T VAGINALIS RRNA SPEC QL NAA+PROBE: NEGATIVE

## 2025-09-05 ENCOUNTER — LAB (OUTPATIENT)
Age: 55
End: 2025-09-05

## 2025-09-05 DIAGNOSIS — Z13.6 ENCOUNTER FOR LIPID SCREENING FOR CARDIOVASCULAR DISEASE: ICD-10-CM

## 2025-09-05 DIAGNOSIS — F33.1 MAJOR DEPRESSIVE DISORDER, RECURRENT, MODERATE (HCC): ICD-10-CM

## 2025-09-05 DIAGNOSIS — Z13.220 ENCOUNTER FOR LIPID SCREENING FOR CARDIOVASCULAR DISEASE: ICD-10-CM

## 2025-09-05 DIAGNOSIS — E03.9 ACQUIRED HYPOTHYROIDISM: ICD-10-CM

## 2025-09-05 DIAGNOSIS — Z13.1 DIABETES MELLITUS SCREENING: ICD-10-CM

## 2025-09-05 DIAGNOSIS — Z01.419 WELL WOMAN EXAM WITH ROUTINE GYNECOLOGICAL EXAM: ICD-10-CM

## 2025-09-06 LAB
ALBUMIN SERPL-MCNC: 3.8 G/DL (ref 3.5–5.2)
ALBUMIN/GLOB SERPL: 1.1 (ref 1.1–2.2)
ALP SERPL-CCNC: 89 U/L (ref 35–104)
ALT SERPL-CCNC: 25 U/L (ref 10–35)
ANION GAP SERPL CALC-SCNC: 11 MMOL/L (ref 2–14)
AST SERPL-CCNC: 23 U/L (ref 10–35)
BASOPHILS # BLD: 0.06 K/UL (ref 0–0.1)
BASOPHILS NFR BLD: 1 % (ref 0–1)
BILIRUB SERPL-MCNC: 0.4 MG/DL (ref 0–1.2)
BUN SERPL-MCNC: 16 MG/DL (ref 6–20)
BUN/CREAT SERPL: 16 (ref 12–20)
CALCIUM SERPL-MCNC: 9.4 MG/DL (ref 8.6–10)
CHLORIDE SERPL-SCNC: 103 MMOL/L (ref 98–107)
CHOLEST SERPL-MCNC: 231 MG/DL (ref 0–200)
CO2 SERPL-SCNC: 26 MMOL/L (ref 20–29)
CREAT SERPL-MCNC: 1.05 MG/DL (ref 0.6–1)
DIFFERENTIAL METHOD BLD: NORMAL
EOSINOPHIL # BLD: 0.08 K/UL (ref 0–0.4)
EOSINOPHIL NFR BLD: 1.3 % (ref 0–7)
ERYTHROCYTE [DISTWIDTH] IN BLOOD BY AUTOMATED COUNT: 12.7 % (ref 11.5–14.5)
EST. AVERAGE GLUCOSE BLD GHB EST-MCNC: 119 MG/DL
GLOBULIN SER CALC-MCNC: 3.4 G/DL (ref 2–4)
GLUCOSE SERPL-MCNC: 95 MG/DL (ref 65–100)
HBA1C MFR BLD: 5.8 % (ref 4–5.6)
HCT VFR BLD AUTO: 44.2 % (ref 35–47)
HDLC SERPL-MCNC: 74 MG/DL (ref 40–60)
HDLC SERPL: 3.1 (ref 0–5)
HGB BLD-MCNC: 14 G/DL (ref 11.5–16)
IMM GRANULOCYTES # BLD AUTO: 0.02 K/UL (ref 0–0.04)
IMM GRANULOCYTES NFR BLD AUTO: 0.3 % (ref 0–0.5)
LDLC SERPL CALC-MCNC: 141 MG/DL (ref 0–100)
LYMPHOCYTES # BLD: 1.93 K/UL (ref 0.8–3.5)
LYMPHOCYTES NFR BLD: 31.7 % (ref 12–49)
MCH RBC QN AUTO: 30.2 PG (ref 26–34)
MCHC RBC AUTO-ENTMCNC: 31.7 G/DL (ref 30–36.5)
MCV RBC AUTO: 95.3 FL (ref 80–99)
MONOCYTES # BLD: 0.33 K/UL (ref 0–1)
MONOCYTES NFR BLD: 5.4 % (ref 5–13)
NEUTS SEG # BLD: 3.66 K/UL (ref 1.8–8)
NEUTS SEG NFR BLD: 60.3 % (ref 32–75)
NRBC # BLD: 0 K/UL (ref 0–0.01)
NRBC BLD-RTO: 0 PER 100 WBC
PLATELET # BLD AUTO: 242 K/UL (ref 150–400)
PMV BLD AUTO: 11.1 FL (ref 8.9–12.9)
POTASSIUM SERPL-SCNC: 4.6 MMOL/L (ref 3.5–5.1)
PROT SERPL-MCNC: 7.2 G/DL (ref 6.4–8.3)
RBC # BLD AUTO: 4.64 M/UL (ref 3.8–5.2)
SODIUM SERPL-SCNC: 141 MMOL/L (ref 136–145)
T4 FREE SERPL-MCNC: 1.1 NG/DL (ref 0.9–1.6)
TRIGL SERPL-MCNC: 79 MG/DL (ref 0–150)
TSH, 3RD GENERATION: 4.49 UIU/ML (ref 0.27–4.2)
VLDLC SERPL CALC-MCNC: 16 MG/DL
WBC # BLD AUTO: 6.1 K/UL (ref 3.6–11)